# Patient Record
Sex: FEMALE | Race: WHITE | Employment: OTHER | URBAN - NONMETROPOLITAN AREA
[De-identification: names, ages, dates, MRNs, and addresses within clinical notes are randomized per-mention and may not be internally consistent; named-entity substitution may affect disease eponyms.]

---

## 2017-03-17 ENCOUNTER — E-VISIT (OUTPATIENT)
Dept: PRIMARY CARE CLINIC | Age: 62
End: 2017-03-17

## 2017-03-17 DIAGNOSIS — R30.0 DYSURIA: Primary | ICD-10-CM

## 2017-08-06 RX ORDER — OMEPRAZOLE 20 MG/1
20 CAPSULE, DELAYED RELEASE ORAL DAILY
Qty: 90 CAPSULE | Refills: 3 | Status: SHIPPED | OUTPATIENT
Start: 2017-08-06 | End: 2018-11-10 | Stop reason: SDUPTHER

## 2017-09-15 ENCOUNTER — TELEPHONE (OUTPATIENT)
Dept: PRIMARY CARE CLINIC | Age: 62
End: 2017-09-15

## 2017-11-15 ENCOUNTER — TELEPHONE (OUTPATIENT)
Dept: PRIMARY CARE CLINIC | Age: 62
End: 2017-11-15

## 2018-01-10 DIAGNOSIS — Z12.31 VISIT FOR SCREENING MAMMOGRAM: Primary | ICD-10-CM

## 2018-01-17 ENCOUNTER — TELEPHONE (OUTPATIENT)
Dept: PRIMARY CARE CLINIC | Age: 63
End: 2018-01-17

## 2018-01-19 ENCOUNTER — HOSPITAL ENCOUNTER (OUTPATIENT)
Dept: LAB | Age: 63
Setting detail: SPECIMEN
Discharge: HOME | End: 2018-01-19
Payer: COMMERCIAL

## 2018-01-19 DIAGNOSIS — Z00.00 ROUTINE GENERAL MEDICAL EXAMINATION AT A HEALTH CARE FACILITY: ICD-10-CM

## 2018-01-19 LAB
ABSOLUTE EOS #: 0.4 K/UL (ref 0–0.4)
ABSOLUTE IMMATURE GRANULOCYTE: ABNORMAL K/UL (ref 0–0.3)
ABSOLUTE LYMPH #: 2.7 K/UL (ref 1–4.8)
ABSOLUTE MONO #: 0.6 K/UL (ref 0.1–1.2)
ALBUMIN SERPL-MCNC: 4.6 G/DL (ref 3.5–5.2)
ALBUMIN/GLOBULIN RATIO: 1.3 (ref 1–2.5)
ALP BLD-CCNC: 98 U/L (ref 35–104)
ALT SERPL-CCNC: 24 U/L (ref 5–33)
ANION GAP SERPL CALCULATED.3IONS-SCNC: 13 MMOL/L (ref 9–17)
AST SERPL-CCNC: 21 U/L
BASOPHILS # BLD: 1 % (ref 0–1)
BASOPHILS ABSOLUTE: 0.1 K/UL (ref 0–0.2)
BILIRUB SERPL-MCNC: 1.52 MG/DL (ref 0.3–1.2)
BUN BLDV-MCNC: 17 MG/DL (ref 8–23)
BUN/CREAT BLD: 22 (ref 9–20)
CALCIUM SERPL-MCNC: 10.3 MG/DL (ref 8.6–10.4)
CHLORIDE BLD-SCNC: 100 MMOL/L (ref 98–107)
CHOLESTEROL/HDL RATIO: 3.5
CHOLESTEROL: 199 MG/DL
CO2: 28 MMOL/L (ref 20–31)
CREAT SERPL-MCNC: 0.76 MG/DL (ref 0.5–0.9)
DIFFERENTIAL TYPE: ABNORMAL
EOSINOPHILS RELATIVE PERCENT: 5 % (ref 1–7)
GFR AFRICAN AMERICAN: >60 ML/MIN
GFR NON-AFRICAN AMERICAN: >60 ML/MIN
GFR SERPL CREATININE-BSD FRML MDRD: ABNORMAL ML/MIN/{1.73_M2}
GFR SERPL CREATININE-BSD FRML MDRD: ABNORMAL ML/MIN/{1.73_M2}
GLUCOSE BLD-MCNC: 101 MG/DL (ref 70–99)
HCT VFR BLD CALC: 44.4 % (ref 36–46)
HDLC SERPL-MCNC: 57 MG/DL
HEMOGLOBIN: 14.5 G/DL (ref 12–16)
IMMATURE GRANULOCYTES: ABNORMAL %
LDL CHOLESTEROL: 101 MG/DL (ref 0–130)
LYMPHOCYTES # BLD: 32 % (ref 16–46)
MCH RBC QN AUTO: 27.8 PG (ref 26–34)
MCHC RBC AUTO-ENTMCNC: 32.7 G/DL (ref 31–37)
MCV RBC AUTO: 85 FL (ref 80–100)
MONOCYTES # BLD: 7 % (ref 4–11)
NRBC AUTOMATED: ABNORMAL PER 100 WBC
PDW BLD-RTO: 13.7 % (ref 11–14.5)
PLATELET # BLD: 334 K/UL (ref 140–450)
PLATELET ESTIMATE: ABNORMAL
PMV BLD AUTO: 7 FL (ref 6–12)
POTASSIUM SERPL-SCNC: 3.9 MMOL/L (ref 3.7–5.3)
RBC # BLD: 5.22 M/UL (ref 4–5.2)
RBC # BLD: ABNORMAL 10*6/UL
SEG NEUTROPHILS: 55 % (ref 43–77)
SEGMENTED NEUTROPHILS ABSOLUTE COUNT: 4.6 K/UL (ref 1.8–7.7)
SODIUM BLD-SCNC: 141 MMOL/L (ref 135–144)
TOTAL PROTEIN: 8.2 G/DL (ref 6.4–8.3)
TRIGL SERPL-MCNC: 204 MG/DL
VLDLC SERPL CALC-MCNC: ABNORMAL MG/DL (ref 1–30)
WBC # BLD: 8.4 K/UL (ref 3.5–11)
WBC # BLD: ABNORMAL 10*3/UL

## 2018-01-19 PROCEDURE — 80061 LIPID PANEL: CPT

## 2018-01-19 PROCEDURE — 80053 COMPREHEN METABOLIC PANEL: CPT

## 2018-01-19 PROCEDURE — 85025 COMPLETE CBC W/AUTO DIFF WBC: CPT

## 2018-01-19 PROCEDURE — 36415 COLL VENOUS BLD VENIPUNCTURE: CPT

## 2018-02-28 ENCOUNTER — HOSPITAL ENCOUNTER (OUTPATIENT)
Dept: MAMMOGRAPHY | Age: 63
Discharge: HOME | End: 2018-03-02
Payer: COMMERCIAL

## 2018-02-28 DIAGNOSIS — Z12.31 VISIT FOR SCREENING MAMMOGRAM: ICD-10-CM

## 2018-02-28 PROCEDURE — 77063 BREAST TOMOSYNTHESIS BI: CPT

## 2018-03-07 ENCOUNTER — OFFICE VISIT (OUTPATIENT)
Dept: FAMILY MEDICINE CLINIC | Age: 63
End: 2018-03-07
Payer: COMMERCIAL

## 2018-03-07 VITALS
BODY MASS INDEX: 37.82 KG/M2 | WEIGHT: 227 LBS | RESPIRATION RATE: 16 BRPM | HEIGHT: 65 IN | HEART RATE: 68 BPM | DIASTOLIC BLOOD PRESSURE: 88 MMHG | SYSTOLIC BLOOD PRESSURE: 138 MMHG

## 2018-03-07 DIAGNOSIS — Z00.00 ROUTINE GENERAL MEDICAL EXAMINATION AT A HEALTH CARE FACILITY: Primary | ICD-10-CM

## 2018-03-07 DIAGNOSIS — R73.01 IMPAIRED FASTING GLUCOSE: ICD-10-CM

## 2018-03-07 DIAGNOSIS — E78.2 MIXED HYPERLIPIDEMIA: ICD-10-CM

## 2018-03-07 DIAGNOSIS — L82.1 SEBORRHEIC KERATOSIS: ICD-10-CM

## 2018-03-07 DIAGNOSIS — Z11.59 ENCOUNTER FOR HEPATITIS C SCREENING TEST FOR LOW RISK PATIENT: ICD-10-CM

## 2018-03-07 PROCEDURE — 99396 PREV VISIT EST AGE 40-64: CPT | Performed by: FAMILY MEDICINE

## 2018-03-07 ASSESSMENT — PATIENT HEALTH QUESTIONNAIRE - PHQ9
SUM OF ALL RESPONSES TO PHQ9 QUESTIONS 1 & 2: 0
SUM OF ALL RESPONSES TO PHQ QUESTIONS 1-9: 0
2. FEELING DOWN, DEPRESSED OR HOPELESS: 0
1. LITTLE INTEREST OR PLEASURE IN DOING THINGS: 0

## 2018-03-07 ASSESSMENT — ENCOUNTER SYMPTOMS
COUGH: 0
DIARRHEA: 0
SINUS PRESSURE: 0
ABDOMINAL PAIN: 0
VOMITING: 0
WHEEZING: 0
RHINORRHEA: 0
CONSTIPATION: 0
SORE THROAT: 0
TROUBLE SWALLOWING: 0
NAUSEA: 0
EYE DISCHARGE: 0
EYE REDNESS: 0
SHORTNESS OF BREATH: 0

## 2018-03-07 NOTE — PROGRESS NOTES
Patient declines hiv screening and new shingles vaccine at this time. 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 Unsure of date.
Ratio 3.5 <5    Triglycerides 204 (H) <150 mg/dL    VLDL NOT REPORTED 1 - 30 mg/dL   CBC With Auto Differential   Result Value Ref Range    WBC 8.4 3.5 - 11.0 k/uL    RBC 5.22 (H) 4.0 - 5.2 m/uL    Hemoglobin 14.5 12.0 - 16.0 g/dL    Hematocrit 44.4 36 - 46 %    MCV 85.0 80 - 100 fL    MCH 27.8 26 - 34 pg    MCHC 32.7 31 - 37 g/dL    RDW 13.7 11.0 - 14.5 %    Platelets 761 864 - 826 k/uL    MPV 7.0 6.0 - 12.0 fL    NRBC Automated NOT REPORTED per 100 WBC    Differential Type NOT REPORTED     Immature Granulocytes NOT REPORTED 0 %    Absolute Immature Granulocyte NOT REPORTED 0.00 - 0.30 k/uL    WBC Morphology NOT REPORTED     RBC Morphology NOT REPORTED     Platelet Estimate NOT REPORTED     Seg Neutrophils 55 43 - 77 %    Lymphocytes 32 16 - 46 %    Monocytes 7 4 - 11 %    Eosinophils % 5 1 - 7 %    Basophils 1 0 - 1 %    Segs Absolute 4.60 1.8 - 7.7 k/uL    Absolute Lymph # 2.70 1.0 - 4.8 k/uL    Absolute Mono # 0.60 0.1 - 1.2 k/uL    Absolute Eos # 0.40 0.0 - 0.4 k/uL    Basophils # 0.10 0.0 - 0.2 k/uL     Did discuss dietary modification and increased exercise to keep cholesterol and blood sugar under good control. Other review of systems are as noted below. Did review patient's med list, allergies, social history, fam history, pmhx and pshx today as noted in the record. Preventative measures are reviewed today. See health maintenance section for complete preventative plan of care. Review of Systems   Constitutional: Negative for chills, fatigue and fever. HENT: Negative for congestion, ear pain, postnasal drip, rhinorrhea, sinus pressure, sore throat and trouble swallowing. Eyes: Negative for discharge and redness. Respiratory: Negative for cough, shortness of breath and wheezing. Cardiovascular: Negative for chest pain. Gastrointestinal: Negative for abdominal pain, constipation, diarrhea, nausea and vomiting. Musculoskeletal: Negative for arthralgias, myalgias and neck pain.    Skin:

## 2018-04-05 ENCOUNTER — PATIENT MESSAGE (OUTPATIENT)
Dept: FAMILY MEDICINE CLINIC | Age: 63
End: 2018-04-05

## 2018-08-02 ENCOUNTER — OFFICE VISIT (OUTPATIENT)
Dept: FAMILY MEDICINE CLINIC | Age: 63
End: 2018-08-02
Payer: COMMERCIAL

## 2018-08-02 VITALS
SYSTOLIC BLOOD PRESSURE: 130 MMHG | HEIGHT: 65 IN | BODY MASS INDEX: 36.82 KG/M2 | WEIGHT: 221 LBS | RESPIRATION RATE: 16 BRPM | HEART RATE: 76 BPM | DIASTOLIC BLOOD PRESSURE: 80 MMHG

## 2018-08-02 DIAGNOSIS — R73.01 IMPAIRED FASTING GLUCOSE: Primary | ICD-10-CM

## 2018-08-02 DIAGNOSIS — Z00.00 ROUTINE GENERAL MEDICAL EXAMINATION AT A HEALTH CARE FACILITY: ICD-10-CM

## 2018-08-02 DIAGNOSIS — L98.9 SKIN LESION OF LEFT LEG: ICD-10-CM

## 2018-08-02 DIAGNOSIS — E78.2 MIXED HYPERLIPIDEMIA: ICD-10-CM

## 2018-08-02 PROCEDURE — 99213 OFFICE O/P EST LOW 20 MIN: CPT | Performed by: FAMILY MEDICINE

## 2018-08-02 ASSESSMENT — ENCOUNTER SYMPTOMS
ABDOMINAL PAIN: 0
NAUSEA: 0
WHEEZING: 0
RHINORRHEA: 0
EYE DISCHARGE: 0
CONSTIPATION: 0
SHORTNESS OF BREATH: 0
SORE THROAT: 0
SINUS PRESSURE: 0
TROUBLE SWALLOWING: 0
DIARRHEA: 0
VOMITING: 0
COUGH: 0
EYE REDNESS: 0

## 2018-08-02 NOTE — PROGRESS NOTES
33 U/L    AST 21 <32 U/L    Total Bilirubin 1.52 (H) 0.3 - 1.2 mg/dL    Total Protein 8.2 6.4 - 8.3 g/dL    Alb 4.6 3.5 - 5.2 g/dL    Albumin/Globulin Ratio 1.3 1.0 - 2.5    GFR Non-African American >60 >60 mL/min    GFR African American >60 >60 mL/min    GFR Comment          GFR Staging NOT REPORTED    Lipid Panel   Result Value Ref Range    Cholesterol 199 <200 mg/dL    HDL 57 >40 mg/dL    LDL Cholesterol 101 0 - 130 mg/dL    Chol/HDL Ratio 3.5 <5    Triglycerides 204 (H) <150 mg/dL    VLDL NOT REPORTED 1 - 30 mg/dL   CBC With Auto Differential   Result Value Ref Range    WBC 8.4 3.5 - 11.0 k/uL    RBC 5.22 (H) 4.0 - 5.2 m/uL    Hemoglobin 14.5 12.0 - 16.0 g/dL    Hematocrit 44.4 36 - 46 %    MCV 85.0 80 - 100 fL    MCH 27.8 26 - 34 pg    MCHC 32.7 31 - 37 g/dL    RDW 13.7 11.0 - 14.5 %    Platelets 623 672 - 742 k/uL    MPV 7.0 6.0 - 12.0 fL    NRBC Automated NOT REPORTED per 100 WBC    Differential Type NOT REPORTED     Immature Granulocytes NOT REPORTED 0 %    Absolute Immature Granulocyte NOT REPORTED 0.00 - 0.30 k/uL    WBC Morphology NOT REPORTED     RBC Morphology NOT REPORTED     Platelet Estimate NOT REPORTED     Seg Neutrophils 55 43 - 77 %    Lymphocytes 32 16 - 46 %    Monocytes 7 4 - 11 %    Eosinophils % 5 1 - 7 %    Basophils 1 0 - 1 %    Segs Absolute 4.60 1.8 - 7.7 k/uL    Absolute Lymph # 2.70 1.0 - 4.8 k/uL    Absolute Mono # 0.60 0.1 - 1.2 k/uL    Absolute Eos # 0.40 0.0 - 0.4 k/uL    Basophils # 0.10 0.0 - 0.2 k/uL   Other review of systems are as noted below. Did review patient's med list, allergies, social history, fam history, pmhx and pshx today as noted in the record. Preventative measures are reviewed today. See health maintenance section for complete preventative plan of care. Review of Systems   Constitutional: Negative for chills, fatigue and fever. HENT: Negative for congestion, ear pain, postnasal drip, rhinorrhea, sinus pressure, sore throat and trouble swallowing. Eyes: Negative for discharge and redness. Respiratory: Negative for cough, shortness of breath and wheezing. Cardiovascular: Negative for chest pain. Gastrointestinal: Negative for abdominal pain, constipation, diarrhea, nausea and vomiting. Musculoskeletal: Negative for arthralgias, myalgias and neck pain. Skin: Negative for rash and wound. Thickened skin lesion of the left lower leg   Allergic/Immunologic: Negative for environmental allergies. Neurological: Negative for dizziness, weakness, light-headedness and headaches. Hematological: Negative for adenopathy. Psychiatric/Behavioral: Negative. Objective:   Physical Exam   Constitutional: She is oriented to person, place, and time. She appears well-developed and well-nourished. No distress. HENT:   Head: Normocephalic and atraumatic. Right Ear: External ear normal.   Left Ear: External ear normal.   Nose: Nose normal.   Mouth/Throat: Oropharynx is clear and moist. No oropharyngeal exudate. Eyes: Conjunctivae and EOM are normal. Pupils are equal, round, and reactive to light. Right eye exhibits no discharge. Left eye exhibits no discharge. Neck: Normal range of motion. Neck supple. No thyromegaly present. Cardiovascular: Normal rate, regular rhythm and normal heart sounds. Pulmonary/Chest: Effort normal and breath sounds normal. She has no wheezes. She has no rales. Abdominal: Soft. Bowel sounds are normal.   Musculoskeletal: She exhibits no edema. Lymphadenopathy:     She has no cervical adenopathy. Neurological: She is alert and oriented to person, place, and time. Skin: Skin is warm and dry. No rash noted. She is not diaphoretic. Light brown pigmented skin lesion to the left lower leg. Lesion is thick and fibrous in nature. Lesion is about 1 cm in diameter. Appears to be more of a fibrous benign nevus   Psychiatric: She has a normal mood and affect.  Her behavior is normal. Judgment and thought content

## 2018-11-12 RX ORDER — OMEPRAZOLE 20 MG/1
20 CAPSULE, DELAYED RELEASE ORAL DAILY
Qty: 90 CAPSULE | Refills: 3 | Status: SHIPPED | OUTPATIENT
Start: 2018-11-12 | End: 2019-12-06 | Stop reason: SDUPTHER

## 2019-01-22 ENCOUNTER — PATIENT MESSAGE (OUTPATIENT)
Dept: FAMILY MEDICINE CLINIC | Age: 64
End: 2019-01-22

## 2019-01-22 RX ORDER — VALACYCLOVIR HYDROCHLORIDE 1 G/1
TABLET, FILM COATED ORAL
Qty: 4 TABLET | Refills: 6 | Status: SHIPPED | OUTPATIENT
Start: 2019-01-22 | End: 2020-03-25

## 2019-01-28 ENCOUNTER — CLINICAL DOCUMENTATION (OUTPATIENT)
Dept: PHARMACY | Facility: CLINIC | Age: 64
End: 2019-01-28

## 2019-03-13 ENCOUNTER — TELEPHONE (OUTPATIENT)
Dept: PRIMARY CARE CLINIC | Age: 64
End: 2019-03-13

## 2019-04-25 ENCOUNTER — HOSPITAL ENCOUNTER (OUTPATIENT)
Dept: LAB | Age: 64
Discharge: HOME | End: 2019-04-25
Payer: COMMERCIAL

## 2019-04-25 DIAGNOSIS — E78.2 MIXED HYPERLIPIDEMIA: ICD-10-CM

## 2019-04-25 DIAGNOSIS — Z00.00 ROUTINE GENERAL MEDICAL EXAMINATION AT A HEALTH CARE FACILITY: ICD-10-CM

## 2019-04-25 DIAGNOSIS — R73.01 IMPAIRED FASTING GLUCOSE: ICD-10-CM

## 2019-04-25 DIAGNOSIS — Z11.59 ENCOUNTER FOR HEPATITIS C SCREENING TEST FOR LOW RISK PATIENT: ICD-10-CM

## 2019-04-25 LAB
ABSOLUTE EOS #: 0.5 K/UL (ref 0–0.4)
ABSOLUTE IMMATURE GRANULOCYTE: ABNORMAL K/UL (ref 0–0.3)
ABSOLUTE LYMPH #: 2.1 K/UL (ref 1–4.8)
ABSOLUTE MONO #: 0.6 K/UL (ref 0.1–1.2)
ALBUMIN SERPL-MCNC: 4.6 G/DL (ref 3.5–5.2)
ALBUMIN/GLOBULIN RATIO: 1.4 (ref 1–2.5)
ALP BLD-CCNC: 101 U/L (ref 35–104)
ALT SERPL-CCNC: 24 U/L (ref 5–33)
ANION GAP SERPL CALCULATED.3IONS-SCNC: 11 MMOL/L (ref 9–17)
AST SERPL-CCNC: 20 U/L
BASOPHILS # BLD: 1 % (ref 0–1)
BASOPHILS ABSOLUTE: 0.1 K/UL (ref 0–0.2)
BILIRUB SERPL-MCNC: 1.61 MG/DL (ref 0.3–1.2)
BUN BLDV-MCNC: 19 MG/DL (ref 8–23)
BUN/CREAT BLD: 29 (ref 9–20)
CALCIUM SERPL-MCNC: 9.6 MG/DL (ref 8.6–10.4)
CHLORIDE BLD-SCNC: 105 MMOL/L (ref 98–107)
CHOLESTEROL/HDL RATIO: 3.5
CHOLESTEROL: 182 MG/DL
CO2: 26 MMOL/L (ref 20–31)
CREAT SERPL-MCNC: 0.66 MG/DL (ref 0.5–0.9)
DIFFERENTIAL TYPE: ABNORMAL
EOSINOPHILS RELATIVE PERCENT: 5 % (ref 1–7)
ESTIMATED AVERAGE GLUCOSE: 111 MG/DL
GFR AFRICAN AMERICAN: >60 ML/MIN
GFR NON-AFRICAN AMERICAN: >60 ML/MIN
GFR SERPL CREATININE-BSD FRML MDRD: ABNORMAL ML/MIN/{1.73_M2}
GFR SERPL CREATININE-BSD FRML MDRD: ABNORMAL ML/MIN/{1.73_M2}
GLUCOSE BLD-MCNC: 116 MG/DL (ref 70–99)
HBA1C MFR BLD: 5.5 % (ref 4.8–5.9)
HCT VFR BLD CALC: 42.1 % (ref 36–46)
HDLC SERPL-MCNC: 52 MG/DL
HEMOGLOBIN: 13.7 G/DL (ref 12–16)
HEPATITIS C ANTIBODY: NONREACTIVE
IMMATURE GRANULOCYTES: ABNORMAL %
LDL CHOLESTEROL: 105 MG/DL (ref 0–130)
LYMPHOCYTES # BLD: 24 % (ref 16–46)
MCH RBC QN AUTO: 27.8 PG (ref 26–34)
MCHC RBC AUTO-ENTMCNC: 32.5 G/DL (ref 31–37)
MCV RBC AUTO: 85.7 FL (ref 80–100)
MONOCYTES # BLD: 7 % (ref 4–11)
NRBC AUTOMATED: ABNORMAL PER 100 WBC
PDW BLD-RTO: 13.9 % (ref 11–14.5)
PLATELET # BLD: 357 K/UL (ref 140–450)
PLATELET ESTIMATE: ABNORMAL
PMV BLD AUTO: 7 FL (ref 6–12)
POTASSIUM SERPL-SCNC: 4.4 MMOL/L (ref 3.7–5.3)
RBC # BLD: 4.92 M/UL (ref 4–5.2)
RBC # BLD: ABNORMAL 10*6/UL
SEG NEUTROPHILS: 63 % (ref 43–77)
SEGMENTED NEUTROPHILS ABSOLUTE COUNT: 5.7 K/UL (ref 1.8–7.7)
SODIUM BLD-SCNC: 142 MMOL/L (ref 135–144)
TOTAL PROTEIN: 7.8 G/DL (ref 6.4–8.3)
TRIGL SERPL-MCNC: 123 MG/DL
VLDLC SERPL CALC-MCNC: NORMAL MG/DL (ref 1–30)
WBC # BLD: 8.9 K/UL (ref 3.5–11)
WBC # BLD: ABNORMAL 10*3/UL

## 2019-04-25 PROCEDURE — 80061 LIPID PANEL: CPT

## 2019-04-25 PROCEDURE — 86803 HEPATITIS C AB TEST: CPT

## 2019-04-25 PROCEDURE — 85025 COMPLETE CBC W/AUTO DIFF WBC: CPT

## 2019-04-25 PROCEDURE — 36415 COLL VENOUS BLD VENIPUNCTURE: CPT

## 2019-04-25 PROCEDURE — 83036 HEMOGLOBIN GLYCOSYLATED A1C: CPT

## 2019-04-25 PROCEDURE — 80053 COMPREHEN METABOLIC PANEL: CPT

## 2019-04-26 ENCOUNTER — TELEPHONE (OUTPATIENT)
Dept: MAMMOGRAPHY | Age: 64
End: 2019-04-26

## 2019-04-26 DIAGNOSIS — Z12.31 VISIT FOR SCREENING MAMMOGRAM: Primary | ICD-10-CM

## 2019-04-29 ENCOUNTER — OFFICE VISIT (OUTPATIENT)
Dept: FAMILY MEDICINE CLINIC | Age: 64
End: 2019-04-29
Payer: COMMERCIAL

## 2019-04-29 ENCOUNTER — HOSPITAL ENCOUNTER (OUTPATIENT)
Dept: MAMMOGRAPHY | Age: 64
Discharge: HOME | End: 2019-05-01
Payer: COMMERCIAL

## 2019-04-29 VITALS
BODY MASS INDEX: 37.99 KG/M2 | RESPIRATION RATE: 16 BRPM | DIASTOLIC BLOOD PRESSURE: 80 MMHG | HEART RATE: 72 BPM | SYSTOLIC BLOOD PRESSURE: 130 MMHG | HEIGHT: 65 IN | WEIGHT: 228 LBS

## 2019-04-29 DIAGNOSIS — R73.01 IMPAIRED FASTING GLUCOSE: ICD-10-CM

## 2019-04-29 DIAGNOSIS — Z00.00 ROUTINE GENERAL MEDICAL EXAMINATION AT A HEALTH CARE FACILITY: Primary | ICD-10-CM

## 2019-04-29 DIAGNOSIS — Z12.31 VISIT FOR SCREENING MAMMOGRAM: ICD-10-CM

## 2019-04-29 DIAGNOSIS — E78.2 MIXED HYPERLIPIDEMIA: ICD-10-CM

## 2019-04-29 PROCEDURE — 99396 PREV VISIT EST AGE 40-64: CPT | Performed by: FAMILY MEDICINE

## 2019-04-29 PROCEDURE — 77063 BREAST TOMOSYNTHESIS BI: CPT

## 2019-04-29 ASSESSMENT — ENCOUNTER SYMPTOMS
SORE THROAT: 0
NAUSEA: 0
VOMITING: 0
EYE REDNESS: 0
ABDOMINAL PAIN: 0
SHORTNESS OF BREATH: 0
CONSTIPATION: 0
TROUBLE SWALLOWING: 0
SINUS PRESSURE: 0
EYE DISCHARGE: 0
COUGH: 0
RHINORRHEA: 0
DIARRHEA: 0
WHEEZING: 0

## 2019-04-29 ASSESSMENT — PATIENT HEALTH QUESTIONNAIRE - PHQ9
SUM OF ALL RESPONSES TO PHQ9 QUESTIONS 1 & 2: 0
SUM OF ALL RESPONSES TO PHQ QUESTIONS 1-9: 0
2. FEELING DOWN, DEPRESSED OR HOPELESS: 0
1. LITTLE INTEREST OR PLEASURE IN DOING THINGS: 0
SUM OF ALL RESPONSES TO PHQ QUESTIONS 1-9: 0

## 2019-04-29 NOTE — PROGRESS NOTES
2019    Mindi Salas (:  1955) is a 59 y.o. female, here for a preventive medicine evaluation. Patient comes in today for a routine general physical exam and for follow up of chronic health issues   Chief Complaint   Patient presents with    Annual Exam     last 3/7/18; Be Well Within    Hyperlipidemia     6 mo f/u    Blood Sugar Problem     [facility]; 6 mo f/u    Discuss Labs     drawn 19   . Patient seems to be doing relatively well overall. Does struggle with her weight. She has been trying to quantity that she eats and doing more exercise but still struggles to lose any weight. Encouraged her to continue with dietary efforts and routine exercise to keep her weight optimally controlled. Her lab work looks better than last check. Her cholesterol levels have improved with just slight elevation in her triglycerides but otherwise much better compared to last check. Did encourage continued low fat/high fiber diet in order to keep this optimally controlled. As a known history of impaired fasting glucose and her blood sugar is more elevated than last check but her hemoglobin A1c shows her blood sugar averages are staying within an acceptable range. Encouraged continued low carb/low sugar diet and routine exercise keep this optimally controlled. The rest her labs are essentially normal.  Patient otherwise has no other acute medical concerns at this time. .  Patient's recent lab reports are as follows:    Results for orders placed or performed during the hospital encounter of 19   Lipid Panel   Result Value Ref Range    Cholesterol 182 <200 mg/dL    HDL 52 >40 mg/dL    LDL Cholesterol 105 0 - 130 mg/dL    Chol/HDL Ratio 3.5 <5    Triglycerides 123 <150 mg/dL    VLDL NOT REPORTED 1 - 30 mg/dL   Hemoglobin A1C   Result Value Ref Range    Hemoglobin A1C 5.5 4.8 - 5.9 %    Estimated Avg Glucose 111 mg/dL   Comprehensive Metabolic Panel   Result Value Ref Range    Glucose 116 (H) 70 - 99 mg/dL    BUN 19 8 - 23 mg/dL    CREATININE 0.66 0.50 - 0.90 mg/dL    Bun/Cre Ratio 29 (H) 9 - 20    Calcium 9.6 8.6 - 10.4 mg/dL    Sodium 142 135 - 144 mmol/L    Potassium 4.4 3.7 - 5.3 mmol/L    Chloride 105 98 - 107 mmol/L    CO2 26 20 - 31 mmol/L    Anion Gap 11 9 - 17 mmol/L    Alkaline Phosphatase 101 35 - 104 U/L    ALT 24 5 - 33 U/L    AST 20 <32 U/L    Total Bilirubin 1.61 (H) 0.3 - 1.2 mg/dL    Total Protein 7.8 6.4 - 8.3 g/dL    Alb 4.6 3.5 - 5.2 g/dL    Albumin/Globulin Ratio 1.4 1.0 - 2.5    GFR Non-African American >60 >60 mL/min    GFR African American >60 >60 mL/min    GFR Comment          GFR Staging NOT REPORTED    CBC Auto Differential   Result Value Ref Range    WBC 8.9 3.5 - 11.0 k/uL    RBC 4.92 4.0 - 5.2 m/uL    Hemoglobin 13.7 12.0 - 16.0 g/dL    Hematocrit 42.1 36 - 46 %    MCV 85.7 80 - 100 fL    MCH 27.8 26 - 34 pg    MCHC 32.5 31 - 37 g/dL    RDW 13.9 11.0 - 14.5 %    Platelets 539 962 - 419 k/uL    MPV 7.0 6.0 - 12.0 fL    NRBC Automated NOT REPORTED per 100 WBC    Differential Type NOT REPORTED     Immature Granulocytes NOT REPORTED 0 %    Absolute Immature Granulocyte NOT REPORTED 0.00 - 0.30 k/uL    WBC Morphology NOT REPORTED     RBC Morphology NOT REPORTED     Platelet Estimate NOT REPORTED     Seg Neutrophils 63 43 - 77 %    Lymphocytes 24 16 - 46 %    Monocytes 7 4 - 11 %    Eosinophils % 5 1 - 7 %    Basophils 1 0 - 1 %    Segs Absolute 5.70 1.8 - 7.7 k/uL    Absolute Lymph # 2.10 1.0 - 4.8 k/uL    Absolute Mono # 0.60 0.1 - 1.2 k/uL    Absolute Eos # 0.50 (H) 0.0 - 0.4 k/uL    Basophils # 0.10 0.0 - 0.2 k/uL   Hepatitis C Antibody   Result Value Ref Range    Hepatitis C Ab NONREACTIVE NONREACTIVE     Did discuss dietary modification and increased exercise to keep cholesterol and blood sugar under good control. Other review of systems are as noted below. Preventative measures are reviewed today.  See health maintenance section for complete preventative plan of care.    Patient Active Problem List   Diagnosis    Impaired fasting glucose    Mixed hyperlipidemia       Review of Systems   Constitutional: Negative for chills, fatigue and fever. HENT: Negative for congestion, ear pain, postnasal drip, rhinorrhea, sinus pressure, sore throat and trouble swallowing. Eyes: Negative for discharge and redness. Respiratory: Negative for cough, shortness of breath and wheezing. Cardiovascular: Negative for chest pain. Gastrointestinal: Negative for abdominal pain, constipation, diarrhea, nausea and vomiting. Genitourinary: Negative. Musculoskeletal: Negative for arthralgias, myalgias and neck pain. Skin: Negative for rash and wound. Allergic/Immunologic: Negative for environmental allergies. Neurological: Negative for dizziness, weakness, light-headedness and headaches. Hematological: Negative for adenopathy. Psychiatric/Behavioral: Negative. Prior to Visit Medications    Medication Sig Taking? Authorizing Provider   omeprazole (PRILOSEC) 20 MG delayed release capsule TAKE 1 CAPSULE BY MOUTH DAILY Yes Louann Lennon MD   Multiple Vitamins-Minerals (MULTIVITAMIN PO) Take 1 tablet by mouth daily. Yes Historical Provider, MD   valACYclovir (VALTREX) 1 g tablet 2 TABS BID FOR 1 DAY  Caro Leavitt DO   ibuprofen (ADVIL;MOTRIN) 600 MG tablet Take 600 mg by mouth every 8 hours as needed for Pain. 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   Did have Laser Cone procedure       Past Surgical History:   Procedure Laterality Date    BLADDER SUSPENSION  2014    Dr Charles Mitchell BIOPSY  12/09/1999    Laser cone due to HSIL     SECTION      CHOLECYSTECTOMY      COLONOSCOPY  10/17/2016    hyperplastic  polyp, Dr. Dilma Andrea, VAGINAL  2014    Dr Rey Stone; [facility] with BSO, external [place] culdoplasty with uterosacral ligament suspension, anterior colprrhapy, cystourethyocopy, posterior colporrhapy    SALPINGO-OOPHORECTOMY Bilateral 2014    Dr Tyrel Wells  10/17/2016    gastritis, superficial ulcer in esophagus, Dr. Chela Lees History    Marital status:      Spouse name: Not on file    Number of children: Not on file    Years of education: Not on file    Highest education level: Not on file   Occupational History    Not on file   Social Needs    Financial resource strain: Not on file    Food insecurity:     Worry: Not on file     Inability: Not on file    Transportation needs:     Medical: Not on file     Non-medical: Not on file   Tobacco Use    Smoking status: Never Smoker    Smokeless tobacco: Never Used   Substance and Sexual Activity    Alcohol use: No    Drug use: No    Sexual activity: Not on file   Lifestyle    Physical activity:     Days per week: Not on file     Minutes per session: Not on file    Stress: Not on file   Relationships    Social connections:     Talks on phone: Not on file     Gets together: Not on file     Attends Gnosticist service: Not on file     Active member of club or organization: Not on file     Attends meetings of clubs or organizations: Not on file     Relationship status: Not on file    Intimate partner violence:     Fear of current or ex partner: Not on file     Emotionally abused: Not on file     Physically abused: Not on file     Forced sexual activity: Not on file   Other Topics Concern    Not on file   Social History Narrative    Not on file        Family History   Problem Relation Age of Onset    Cancer Mother         PANCREATIC    Cancer Sister BREAST       ADVANCE DIRECTIVE: N, Not Received    Vitals:    04/29/19 0913   BP: 130/80   Site: Left Upper Arm   Position: Sitting   Cuff Size: Large Adult   Pulse: 72   Resp: 16   Weight: 228 lb (103.4 kg)   Height: 5' 4.5\" (1.638 m)     Estimated body mass index is 38.53 kg/m² as calculated from the following:    Height as of this encounter: 5' 4.5\" (1.638 m). Weight as of this encounter: 228 lb (103.4 kg). Physical Exam   Constitutional: She is oriented to person, place, and time. She appears well-developed and well-nourished. No distress. HENT:   Head: Normocephalic and atraumatic. Right Ear: External ear normal.   Left Ear: External ear normal.   Nose: Nose normal.   Mouth/Throat: Oropharynx is clear and moist. No oropharyngeal exudate. Eyes: Pupils are equal, round, and reactive to light. Conjunctivae and EOM are normal. Right eye exhibits no discharge. Left eye exhibits no discharge. Neck: Normal range of motion. Neck supple. No thyromegaly present. Cardiovascular: Normal rate, regular rhythm and normal heart sounds. Pulmonary/Chest: Effort normal and breath sounds normal. She has no wheezes. She has no rales. Abdominal: Soft. Bowel sounds are normal. She exhibits no distension and no mass. There is no tenderness. There is no rebound and no guarding. No hernia. Musculoskeletal: She exhibits no edema. Lymphadenopathy:     She has no cervical adenopathy. Neurological: She is alert and oriented to person, place, and time. Skin: Skin is warm and dry. No rash noted. She is not diaphoretic. Psychiatric: She has a normal mood and affect. Her behavior is normal. Judgment and thought content normal.   Nursing note and vitals reviewed. No flowsheet data found.     Lab Results   Component Value Date    CHOL 182 04/25/2019    CHOL 199 01/19/2018    CHOL 185 07/28/2016    TRIG 123 04/25/2019    TRIG 204 01/19/2018    TRIG 156 07/28/2016    HDL 52 04/25/2019    HDL 57 01/19/2018    HDL 57 07/28/2016    LDLCHOLESTEROL 105 04/25/2019    LDLCHOLESTEROL 101 01/19/2018    LDLCHOLESTEROL 97 07/28/2016    GLUCOSE 116 04/25/2019    LABA1C 5.5 04/25/2019       The 10-year ASCVD risk score (Doroteo Gautam, et al., 2013) is: 4.1%    Values used to calculate the score:      Age: 59 years      Sex: Female      Is Non- : No      Diabetic: No      Tobacco smoker: No      Systolic Blood Pressure: 694 mmHg      Is BP treated: No      HDL Cholesterol: 52 mg/dL      Total Cholesterol: 182 mg/dL    Immunization History   Administered Date(s) Administered    Hepatitis B, unspecified formulation 08/28/1995, 09/27/1995, 02/27/1996    Influenza Virus Vaccine 10/21/2015    Tdap (Boostrix, Adacel) 11/12/2015    Zoster Live (Zostavax) 06/14/2013       Health Maintenance   Topic Date Due    HIV screen  12/21/1971    Shingles Vaccine (2 of 3) 08/09/2013    Flu vaccine (Season Ended) 09/01/2019    Breast cancer screen  02/28/2020    A1C test (Diabetic or Prediabetic)  04/25/2020    Colon cancer screen colonoscopy  10/17/2021    Lipid screen  04/25/2024    DTaP/Tdap/Td vaccine (2 - Td) 11/12/2025    Hepatitis C screen  Completed    Pneumococcal 0-64 years Vaccine  Aged Out       ASSESSMENT/PLAN:  1. Routine general medical examination at a health care facility  Healthy dietary intake and routine exercise are encouraged. - Lipid Panel; Future  - CBC Auto Differential; Future  - Comprehensive Metabolic Panel; Future    2. Impaired fasting glucose  Continued low carb/low sugar diet and routine exercise encouraged. - Hemoglobin A1C; Future    3. Mixed hyperlipidemia  Stable and controlled with dietary efforts. Continue low-fat/high-fiber diet encouraged. No orders of the defined types were placed in this encounter.     Orders Placed This Encounter   Procedures    Lipid Panel     Standing Status:   Future     Standing Expiration Date:   4/27/2020     Order Specific Question:   Is Patient

## 2019-04-29 NOTE — PATIENT INSTRUCTIONS
Hospital Outpatient Visit on 04/25/2019   Component Date Value Ref Range Status    Cholesterol 04/25/2019 182  <200 mg/dL Final    Comment:    Cholesterol Guidelines:      <200  Desirable   200-240  Borderline      >240  Undesirable         HDL 04/25/2019 52  >40 mg/dL Final    Comment:    HDL Guidelines:    <40     Undesirable   40-59    Borderline    >59     Desirable         LDL Cholesterol 04/25/2019 105  0 - 130 mg/dL Final    Comment:    LDL Guidelines:     <100    Desirable   100-129   Near to/above Desirable   130-159   Borderline      >159   Undesirable     Direct (measured) LDL and calculated LDL are not interchangeable tests.  Chol/HDL Ratio 04/25/2019 3.5  <5 Final            Triglycerides 04/25/2019 123  <150 mg/dL Final    Comment:    Triglyceride Guidelines:     <150   Desirable   150-199  Borderline   200-499  High     >499   Very high   Based on AHA Guidelines for fasting triglyceride, October 2012.          VLDL 04/25/2019 NOT REPORTED  1 - 30 mg/dL Final    Hemoglobin A1C 04/25/2019 5.5  4.8 - 5.9 % Final    Estimated Avg Glucose 04/25/2019 111  mg/dL Final    Glucose 04/25/2019 116* 70 - 99 mg/dL Final    BUN 04/25/2019 19  8 - 23 mg/dL Final    CREATININE 04/25/2019 0.66  0.50 - 0.90 mg/dL Final    Bun/Cre Ratio 04/25/2019 29* 9 - 20 Final    Calcium 04/25/2019 9.6  8.6 - 10.4 mg/dL Final    Sodium 04/25/2019 142  135 - 144 mmol/L Final    Potassium 04/25/2019 4.4  3.7 - 5.3 mmol/L Final    Chloride 04/25/2019 105  98 - 107 mmol/L Final    CO2 04/25/2019 26  20 - 31 mmol/L Final    Anion Gap 04/25/2019 11  9 - 17 mmol/L Final    Alkaline Phosphatase 04/25/2019 101  35 - 104 U/L Final    ALT 04/25/2019 24  5 - 33 U/L Final    AST 04/25/2019 20  <32 U/L Final    Total Bilirubin 04/25/2019 1.61* 0.3 - 1.2 mg/dL Final    Total Protein 04/25/2019 7.8  6.4 - 8.3 g/dL Final    Alb 04/25/2019 4.6  3.5 - 5.2 g/dL Final    Albumin/Globulin Ratio 04/25/2019 1.4  1.0 - 2.5 Final  GFR Non- 04/25/2019 >60  >60 mL/min Final    GFR  04/25/2019 >60  >60 mL/min Final    GFR Comment 04/25/2019        Final    Comment: Average GFR for 61-76 years old:   80 mL/min/1.73sq m  Chronic Kidney Disease:   <60 mL/min/1.73sq m  Kidney failure:   <15 mL/min/1.73sq m              eGFR calculated using average adult body mass.  Additional eGFR calculator available at:        [email or web address]            GFR Staging 04/25/2019 NOT REPORTED   Final    WBC 04/25/2019 8.9  3.5 - 11.0 k/uL Final    RBC 04/25/2019 4.92  4.0 - 5.2 m/uL Final    Hemoglobin 04/25/2019 13.7  12.0 - 16.0 g/dL Final    Hematocrit 04/25/2019 42.1  36 - 46 % Final    MCV 04/25/2019 85.7  80 - 100 fL Final    MCH 04/25/2019 27.8  26 - 34 pg Final    MCHC 04/25/2019 32.5  31 - 37 g/dL Final    RDW 04/25/2019 13.9  11.0 - 14.5 % Final    Platelets 59/44/1494 357  140 - 450 k/uL Final    MPV 04/25/2019 7.0  6.0 - 12.0 fL Final    NRBC Automated 04/25/2019 NOT REPORTED  per 100 WBC Final    Differential Type 04/25/2019 NOT REPORTED   Final    Immature Granulocytes 04/25/2019 NOT REPORTED  0 % Final    Absolute Immature Granulocyte 04/25/2019 NOT REPORTED  0.00 - 0.30 k/uL Final    WBC Morphology 04/25/2019 NOT REPORTED   Final    RBC Morphology 04/25/2019 NOT REPORTED   Final    Platelet Estimate 89/20/0045 NOT REPORTED   Final    Seg Neutrophils 04/25/2019 63  43 - 77 % Final    Lymphocytes 04/25/2019 24  16 - 46 % Final    Monocytes 04/25/2019 7  4 - 11 % Final    Eosinophils % 04/25/2019 5  1 - 7 % Final    Basophils 04/25/2019 1  0 - 1 % Final    Segs Absolute 04/25/2019 5.70  1.8 - 7.7 k/uL Final    Absolute Lymph # 04/25/2019 2.10  1.0 - 4.8 k/uL Final    Absolute Mono # 04/25/2019 0.60  0.1 - 1.2 k/uL Final    Absolute Eos # 04/25/2019 0.50* 0.0 - 0.4 k/uL Final    Basophils # 04/25/2019 0.10  0.0 - 0.2 k/uL Final    Hepatitis C Ab 04/25/2019 NONREACTIVE  NONREACTIVE Final    Comment:       The hepatitis C procedure used in our laboratory is a Chemiluminescent test specific for   three recombinant HCV antigens. A negative anti-HCV result indicates that the antibodies to   hepatitis C virus are not present at this time. Individuals with reactive anti-HCV should be considered infected and infectious until proven   otherwise. Confirmation of all equivocal or reactive results is recommended by ordering   HCV RNA by PCR.

## 2019-12-06 RX ORDER — OMEPRAZOLE 20 MG/1
CAPSULE, DELAYED RELEASE ORAL
Qty: 90 CAPSULE | Refills: 3 | Status: SHIPPED | OUTPATIENT
Start: 2019-12-06 | End: 2021-02-23

## 2020-02-19 ENCOUNTER — OFFICE VISIT (OUTPATIENT)
Dept: FAMILY MEDICINE CLINIC | Age: 65
End: 2020-02-19
Payer: COMMERCIAL

## 2020-02-19 VITALS
OXYGEN SATURATION: 96 % | SYSTOLIC BLOOD PRESSURE: 132 MMHG | TEMPERATURE: 97.8 F | HEIGHT: 64 IN | WEIGHT: 231.6 LBS | BODY MASS INDEX: 39.54 KG/M2 | DIASTOLIC BLOOD PRESSURE: 88 MMHG | HEART RATE: 68 BPM | RESPIRATION RATE: 16 BRPM

## 2020-02-19 PROCEDURE — 90471 IMMUNIZATION ADMIN: CPT | Performed by: FAMILY MEDICINE

## 2020-02-19 PROCEDURE — 99213 OFFICE O/P EST LOW 20 MIN: CPT | Performed by: FAMILY MEDICINE

## 2020-02-19 PROCEDURE — 90750 HZV VACC RECOMBINANT IM: CPT | Performed by: FAMILY MEDICINE

## 2020-02-19 ASSESSMENT — PATIENT HEALTH QUESTIONNAIRE - PHQ9
1. LITTLE INTEREST OR PLEASURE IN DOING THINGS: 0
SUM OF ALL RESPONSES TO PHQ QUESTIONS 1-9: 0
SUM OF ALL RESPONSES TO PHQ9 QUESTIONS 1 & 2: 0
SUM OF ALL RESPONSES TO PHQ QUESTIONS 1-9: 0
DEPRESSION UNABLE TO ASSESS: PT REFUSES
2. FEELING DOWN, DEPRESSED OR HOPELESS: 0

## 2020-02-19 ASSESSMENT — ENCOUNTER SYMPTOMS
RESPIRATORY NEGATIVE: 1
GASTROINTESTINAL NEGATIVE: 1
EYES NEGATIVE: 1
COLOR CHANGE: 1

## 2020-02-19 NOTE — PROGRESS NOTES
well-developed. She is not diaphoretic. HENT:      Head: Normocephalic and atraumatic. Eyes:      Conjunctiva/sclera: Conjunctivae normal.   Neck:      Musculoskeletal: Normal range of motion. Pulmonary:      Effort: Pulmonary effort is normal.   Skin:     General: Skin is warm and dry. Coloration: Skin is not pale. Findings: No erythema or rash. Comments: Dark brown slight raised rough skin lesion to the right cheek. Total diameter of lesion is approximately 1 cm and it does have erythema to the skin edges. Neurological:      Mental Status: She is alert and oriented to person, place, and time. Psychiatric:         Behavior: Behavior normal.         Thought Content: Thought content normal.         Judgment: Judgment normal.         ASSESSMENT/PLAN:  Encounter Diagnoses   Name Primary?  Changing pigmented skin lesion Yes    Need for shingles vaccine      Did perform liquid nitrogen treatment to the skin lesion of the face today. Liquid nitrogen is applied using spray technique until a treatment border that extends 2 mm beyond the wound edges obtained. Patient tolerated this well. Is aware that this will blister and peel and if it does not completely peel she should let my office know and we can retreat. Orders Placed This Encounter   Procedures    Zoster recombinant [facility])     Shingles vaccination is provided today. She is to return to my office in April as scheduled for her routine medical follow-up visit or sooner if any further problems or symptoms arise. (Please note that portions of this note were completed with a voice recognition program. Efforts were made to edit the dictations but occasionally words are mis-transcribed.)        No follow-ups on file. An electronic signature was used to authenticate this note.     --Antonio Hsieh DO on 2/19/2020 at 8:09 AM

## 2020-02-20 ENCOUNTER — PATIENT MESSAGE (OUTPATIENT)
Dept: FAMILY MEDICINE CLINIC | Age: 65
End: 2020-02-20

## 2020-02-21 NOTE — TELEPHONE ENCOUNTER
From: Denton Ortiz  To: Gerald Rao DO  Sent: 2/20/2020 3:33 PM EST  Subject: Non-Urgent Medical Question    Thanks Kimberly Manning for telling me that I may get sick with the shingles shot because I did! Felt crappy, feverish and flu like symptoms. Better today though. As far as my freezing spot. ..how long do I wait to see if it comes off? It really does not feel like it is going to come off.    Just wondering

## 2020-03-25 RX ORDER — VALACYCLOVIR HYDROCHLORIDE 1 G/1
TABLET, FILM COATED ORAL
Qty: 4 TABLET | Refills: 2 | Status: SHIPPED | OUTPATIENT
Start: 2020-03-25 | End: 2020-05-01 | Stop reason: SDUPTHER

## 2020-03-25 NOTE — TELEPHONE ENCOUNTER
Estevan Aly called requesting a refill of the below medication which has been pended for you:     Requested Prescriptions     Pending Prescriptions Disp Refills    valACYclovir (VALTREX) 1 g tablet [Pharmacy Med Name: VALACYCLOVIR HCL 1GM TABS] 4 tablet 6     Sig: TAKE TWO TABLETS TWO TIMES A DAY FOR 1 DAYS (TOTAL OF 4 TABLETS PER COURSE OF TREATMENT)       Last Appointment Date: 2/19/2020  Next Appointment Date: 4/29/2020    Allergies   Allergen Reactions    Adhesive Tape     Bactrim [Sulfamethoxazole-Trimethoprim] Rash

## 2020-04-27 ENCOUNTER — HOSPITAL ENCOUNTER (OUTPATIENT)
Dept: LAB | Age: 65
Discharge: HOME | End: 2020-04-27
Payer: COMMERCIAL

## 2020-04-27 LAB
ABSOLUTE EOS #: 0.45 K/UL (ref 0–0.44)
ABSOLUTE IMMATURE GRANULOCYTE: <0.03 K/UL (ref 0–0.3)
ABSOLUTE LYMPH #: 2.74 K/UL (ref 1.1–3.7)
ABSOLUTE MONO #: 0.64 K/UL (ref 0.1–1.2)
ALBUMIN SERPL-MCNC: 4.5 G/DL (ref 3.5–5.2)
ALBUMIN/GLOBULIN RATIO: 1.3 (ref 1–2.5)
ALP BLD-CCNC: 91 U/L (ref 35–104)
ALT SERPL-CCNC: 40 U/L (ref 5–33)
ANION GAP SERPL CALCULATED.3IONS-SCNC: 12 MMOL/L (ref 9–17)
AST SERPL-CCNC: 31 U/L
BASOPHILS # BLD: 1 % (ref 0–2)
BASOPHILS ABSOLUTE: 0.05 K/UL (ref 0–0.2)
BILIRUB SERPL-MCNC: 1.42 MG/DL (ref 0.3–1.2)
BUN BLDV-MCNC: 15 MG/DL (ref 8–23)
BUN/CREAT BLD: 21 (ref 9–20)
CALCIUM SERPL-MCNC: 9.4 MG/DL (ref 8.6–10.4)
CHLORIDE BLD-SCNC: 106 MMOL/L (ref 98–107)
CHOLESTEROL/HDL RATIO: 3.3
CHOLESTEROL: 175 MG/DL
CO2: 26 MMOL/L (ref 20–31)
CREAT SERPL-MCNC: 0.71 MG/DL (ref 0.5–0.9)
DIFFERENTIAL TYPE: ABNORMAL
EOSINOPHILS RELATIVE PERCENT: 6 % (ref 1–4)
ESTIMATED AVERAGE GLUCOSE: 108 MG/DL
GFR AFRICAN AMERICAN: >60 ML/MIN
GFR NON-AFRICAN AMERICAN: >60 ML/MIN
GFR SERPL CREATININE-BSD FRML MDRD: ABNORMAL ML/MIN/{1.73_M2}
GFR SERPL CREATININE-BSD FRML MDRD: ABNORMAL ML/MIN/{1.73_M2}
GLUCOSE BLD-MCNC: 112 MG/DL (ref 70–99)
HBA1C MFR BLD: 5.4 % (ref 4.8–5.9)
HCT VFR BLD CALC: 41.6 % (ref 36.3–47.1)
HDLC SERPL-MCNC: 53 MG/DL
HEMOGLOBIN: 13.1 G/DL (ref 11.9–15.1)
IMMATURE GRANULOCYTES: 0 %
LDL CHOLESTEROL: 97 MG/DL (ref 0–130)
LYMPHOCYTES # BLD: 36 % (ref 24–43)
MCH RBC QN AUTO: 27.2 PG (ref 25.2–33.5)
MCHC RBC AUTO-ENTMCNC: 31.5 G/DL (ref 25.2–33.5)
MCV RBC AUTO: 86.5 FL (ref 82.6–102.9)
MONOCYTES # BLD: 8 % (ref 3–12)
NRBC AUTOMATED: 0 PER 100 WBC
PDW BLD-RTO: 13.2 % (ref 11.8–14.4)
PLATELET # BLD: 342 K/UL (ref 138–453)
PLATELET ESTIMATE: ABNORMAL
PMV BLD AUTO: 8.5 FL (ref 8.1–13.5)
POTASSIUM SERPL-SCNC: 4.2 MMOL/L (ref 3.7–5.3)
RBC # BLD: 4.81 M/UL (ref 3.95–5.11)
RBC # BLD: ABNORMAL 10*6/UL
SEG NEUTROPHILS: 49 % (ref 36–65)
SEGMENTED NEUTROPHILS ABSOLUTE COUNT: 3.79 K/UL (ref 1.5–8.1)
SODIUM BLD-SCNC: 144 MMOL/L (ref 135–144)
TOTAL PROTEIN: 7.9 G/DL (ref 6.4–8.3)
TRIGL SERPL-MCNC: 124 MG/DL
VLDLC SERPL CALC-MCNC: NORMAL MG/DL (ref 1–30)
WBC # BLD: 7.7 K/UL (ref 3.5–11.3)
WBC # BLD: ABNORMAL 10*3/UL

## 2020-04-27 PROCEDURE — 83036 HEMOGLOBIN GLYCOSYLATED A1C: CPT

## 2020-04-27 PROCEDURE — 80053 COMPREHEN METABOLIC PANEL: CPT

## 2020-04-27 PROCEDURE — 36415 COLL VENOUS BLD VENIPUNCTURE: CPT

## 2020-04-27 PROCEDURE — 80061 LIPID PANEL: CPT

## 2020-04-27 PROCEDURE — 85025 COMPLETE CBC W/AUTO DIFF WBC: CPT

## 2020-04-29 ENCOUNTER — OFFICE VISIT (OUTPATIENT)
Dept: FAMILY MEDICINE CLINIC | Age: 65
End: 2020-04-29
Payer: COMMERCIAL

## 2020-04-29 VITALS
WEIGHT: 235 LBS | TEMPERATURE: 97.9 F | SYSTOLIC BLOOD PRESSURE: 136 MMHG | HEIGHT: 64 IN | DIASTOLIC BLOOD PRESSURE: 80 MMHG | HEART RATE: 72 BPM | BODY MASS INDEX: 40.12 KG/M2

## 2020-04-29 PROCEDURE — 99396 PREV VISIT EST AGE 40-64: CPT | Performed by: FAMILY MEDICINE

## 2020-04-29 PROCEDURE — 90471 IMMUNIZATION ADMIN: CPT | Performed by: FAMILY MEDICINE

## 2020-04-29 PROCEDURE — 90750 HZV VACC RECOMBINANT IM: CPT | Performed by: FAMILY MEDICINE

## 2020-04-29 RX ORDER — LORATADINE 10 MG/1
10 TABLET ORAL DAILY
COMMUNITY
End: 2022-07-28

## 2020-04-29 ASSESSMENT — ENCOUNTER SYMPTOMS
EYE REDNESS: 0
CONSTIPATION: 0
VOMITING: 0
ABDOMINAL PAIN: 0
EYE DISCHARGE: 0
TROUBLE SWALLOWING: 0
DIARRHEA: 0
SORE THROAT: 0
WHEEZING: 0
SHORTNESS OF BREATH: 0
COUGH: 0
SINUS PRESSURE: 0
NAUSEA: 0
RHINORRHEA: 0

## 2020-04-29 NOTE — PROGRESS NOTES
diarrhea, nausea and vomiting. Genitourinary: Negative for dysuria, flank pain, frequency and urgency. Musculoskeletal: Negative for arthralgias, myalgias and neck pain. Skin: Negative for rash and wound. Allergic/Immunologic: Negative for environmental allergies. Neurological: Negative for dizziness, weakness, light-headedness and headaches. Hematological: Negative for adenopathy. Psychiatric/Behavioral: Negative. Prior to Visit Medications    Medication Sig Taking? Authorizing Provider   loratadine (CLARITIN) 10 MG tablet Take 10 mg by mouth daily Yes Historical Provider, MD   valACYclovir (VALTREX) 1 g tablet TAKE TWO TABLETS TWO TIMES A DAY FOR 1 DAYS (TOTAL OF 4 TABLETS PER COURSE OF TREATMENT) Yes Javi Acuña DO   omeprazole (PRILOSEC) 20 MG delayed release capsule TAKE 1 CAPSULE BY MOUTH ONE TIME DAILY Yes Tyesha Rodriguez MD   ibuprofen (ADVIL;MOTRIN) 600 MG tablet Take 600 mg by mouth every 8 hours as needed for Pain. Yes Historical Provider, MD   Multiple Vitamins-Minerals (MULTIVITAMIN PO) Take 1 tablet by mouth daily. Yes Historical Provider, MD        Allergies   Allergen Reactions    Adhesive Tape     Bactrim [Sulfamethoxazole-Trimethoprim] Rash       Past Medical History:   Diagnosis Date    Allergic rhinitis     Gallstones     History of chicken pox     HSIL (high grade squamous intraepithelial lesion) on Pap smear of cervix      with HPV noted on path report.   Did have Laser Cone procedure       Past Surgical History:   Procedure Laterality Date    BLADDER SUSPENSION      Dr Tiffanie Dhaliwal BIOPSY  1999    Laser cone due to HSIL     SECTION      CHOLECYSTECTOMY      COLONOSCOPY  10/17/2016    hyperplastic  polyp, Dr. Candy Mota, VAGINAL  2014    Dr Homer Bey;  with BSO, external [facility] culdoplasty with uterosacral ligament suspension, anterior colprrhapy, cystourethyocopy, posterior colporrhapy    SALPINGO-OOPHORECTOMY Bilateral 05/06/2014    Dr Palma Xie ENDOSCOPY  10/17/2016    gastritis, superficial ulcer in esophagus, Dr. Shetty Camera Marital status:      Spouse name: Not on file    Number of children: Not on file    Years of education: Not on file    Highest education level: Not on file   Occupational History    Not on file   Social Needs    Financial resource strain: Not on file    Food insecurity     Worry: Not on file     Inability: Not on file    Transportation needs     Medical: Not on file     Non-medical: Not on file   Tobacco Use    Smoking status: Never Smoker    Smokeless tobacco: Never Used   Substance and Sexual Activity    Alcohol use: No    Drug use: No    Sexual activity: Not on file   Lifestyle    Physical activity     Days per week: Not on file     Minutes per session: Not on file    Stress: Not on file   Relationships    Social connections     Talks on phone: Not on file     Gets together: Not on file     Attends [employer] service: Not on file     Active member of club or organization: Not on file     Attends meetings of clubs or organizations: Not on file     Relationship status: Not on file    Intimate partner violence     Fear of current or ex partner: Not on file     Emotionally abused: Not on file     Physically abused: Not on file     Forced sexual activity: Not on file   Other Topics Concern    Not on file   Social History Narrative    Not on file        Family History   Problem Relation Age of Onset    Cancer Mother         PANCREATIC    Cancer Sister         BREAST       ADVANCE DIRECTIVE: N, Not Received    Vitals:    04/29/20 0917   BP: 136/80   Site: Left Upper Arm   Position: Sitting   Cuff Size: Large Adult   Pulse: 72   Temp: 97.9 °F (36.6 °C)   TempSrc: Tympanic   Weight: 235 lb (106.6 kg)   Height: 5' 4.49\" (1.638 m)     Estimated body mass index is 39.73 Answer:   screening    Zoster recombinant [facility])    Comprehensive Metabolic Panel     Standing Status:   Future     Standing Expiration Date:   10/26/2021    Lipid Panel     Standing Status:   Future     Standing Expiration Date:   10/26/2021     Order Specific Question:   Is Patient Fasting?/# of Hours     Answer:   yes, 12    CBC Auto Differential     Standing Status:   Future     Standing Expiration Date:   10/26/2021    Hemoglobin A1C     Standing Status:   Future     Standing Expiration Date:   10/26/2021     Patient is to continue to follow a low-carb/low sugar/low-fat diet and increase exercise for optimal blood sugar and cholesterol control. Continue on her current medical therapy. No plans are made at this time. Patient is to return to my office annually for routine general physical exam and follow-up of her chronic her symptoms arise. (Please note that portions of this note were completed with a voice recognition program. Efforts were made to edit the dictations but occasionally words are mis-transcribed.)        Return in about 1 year (around 4/29/2021). An electronic signature was used to authenticate this note.     --Alfonso Meza DO on 4/29/2020 at 10:10 AM

## 2020-04-30 ENCOUNTER — PATIENT MESSAGE (OUTPATIENT)
Dept: FAMILY MEDICINE CLINIC | Age: 65
End: 2020-04-30

## 2020-04-30 RX ORDER — VALACYCLOVIR HYDROCHLORIDE 1 G/1
TABLET, FILM COATED ORAL
Qty: 4 TABLET | Refills: 2 | OUTPATIENT
Start: 2020-04-30

## 2020-04-30 NOTE — TELEPHONE ENCOUNTER
Concetta Wang called requesting a refill of the below medication which has been pended for you: Script was sent 3/25/2020    Requested Prescriptions     Refused Prescriptions Disp Refills    valACYclovir (VALTREX) 1 g tablet [Pharmacy Med Name: VALACYCLOVIR HCL 1GM TABS] 4 tablet 2     Sig: TAKE TWO TABLETS BY MOUTH TWICE A DAY FOR 1 DAY. TOTAL OF 4 TABLETS PER COURSE OF TREATMENT.        Last Appointment Date: 4/29/2020  Next Appointment Date: Visit date not found    Allergies   Allergen Reactions    Adhesive Tape     Bactrim [Sulfamethoxazole-Trimethoprim] Rash

## 2020-05-01 RX ORDER — VALACYCLOVIR HYDROCHLORIDE 1 G/1
TABLET, FILM COATED ORAL
Qty: 12 TABLET | Refills: 1 | Status: SHIPPED | OUTPATIENT
Start: 2020-05-01 | End: 2020-11-02

## 2020-05-01 NOTE — TELEPHONE ENCOUNTER
From: Jenna Watkins  To: Rachelle Contreras DO  Sent: 2020 4:19 PM EDT  Subject: Non-Urgent Medical Question    Hello. I started getting another stupid cold sore. Got one two weeks ago and used the medication, but yesterday when the cold sore started I had 4 pills. I am all out again. Maranda Diaz took the bottle in to get refilled but it was . Anyway, could I get a refill for these stupid cold sores? Let me know. Maranda Diaz works tomorrow again.    Luanne [facility]

## 2020-06-24 ENCOUNTER — HOSPITAL ENCOUNTER (OUTPATIENT)
Dept: MAMMOGRAPHY | Age: 65
Discharge: HOME | End: 2020-06-26
Payer: COMMERCIAL

## 2020-07-09 ENCOUNTER — PATIENT MESSAGE (OUTPATIENT)
Dept: FAMILY MEDICINE CLINIC | Age: 65
End: 2020-07-09

## 2020-07-10 ENCOUNTER — VIRTUAL VISIT (OUTPATIENT)
Dept: FAMILY MEDICINE CLINIC | Age: 65
End: 2020-07-10
Payer: COMMERCIAL

## 2020-07-10 PROCEDURE — 99213 OFFICE O/P EST LOW 20 MIN: CPT | Performed by: FAMILY MEDICINE

## 2020-07-10 RX ORDER — DOXYCYCLINE HYCLATE 100 MG
100 TABLET ORAL 2 TIMES DAILY
Qty: 20 TABLET | Refills: 0 | Status: SHIPPED | OUTPATIENT
Start: 2020-07-10 | End: 2020-11-17

## 2020-07-10 ASSESSMENT — ENCOUNTER SYMPTOMS: COLOR CHANGE: 0

## 2020-07-10 NOTE — TELEPHONE ENCOUNTER
From: Lj Welsh  To: John Jiménez DO  Sent: 7/9/2020 10:58 PM EDT  Subject: Non-Urgent Medical Question    Nabil Jacques. I was suppose to get my mammogram but I have to reschedule it. I had a boil on my breast and it would have shown a bump so they did not want to do it. The boil is a lot smaller but now I have another one on the other breast. I an doing heat compresses and washing with antibacterial soap. I know that I have been sweating a lot and I believe that is why I have these boils. I can send a picture if need. I was hoping that I could get some medicine. I think it would help it to heal sooner. I know it is a boil and nothing different. Let me know if I can get some medicine.  Thanks Ismael Guzman

## 2020-07-10 NOTE — PROGRESS NOTES
of chicken pox     HSIL (high grade squamous intraepithelial lesion) on Pap smear of cervix      with HPV noted on path report. Did have Laser Cone procedure   ,   Past Surgical History:   Procedure Laterality Date    BLADDER SUSPENSION      Dr Saeed Older  1999    Laser cone due to HSIL     SECTION      CHOLECYSTECTOMY      COLONOSCOPY  10/17/2016    hyperplastic  polyp, Dr. Martel Smaller, VAGINAL  2014    Dr Harvey Petit; VH with BSO, external [place] culdoplasty with uterosacral ligament suspension, anterior colprrhapy, cystourethyocopy, posterior colporrhapy    SALPINGO-OOPHORECTOMY Bilateral 2014    Dr Dipesh Braun  10/17/2016    gastritis, superficial ulcer in esophagus, Dr. Ulysses Huber       Physical Exam  Vitals signs and nursing note reviewed. Constitutional:       General: She is not in acute distress. Appearance: Normal appearance. HENT:      Head: Normocephalic and atraumatic. Right Ear: External ear normal.      Left Ear: External ear normal.      Nose: Nose normal. No congestion or rhinorrhea. Mouth/Throat:      Mouth: Mucous membranes are moist.   Eyes:      General:         Right eye: No discharge. Left eye: No discharge. Extraocular Movements: Extraocular movements intact. Conjunctiva/sclera: Conjunctivae normal.   Neck:      Musculoskeletal: Normal range of motion. Pulmonary:      Effort: Pulmonary effort is normal.   Skin:     Findings: No erythema or rash. Neurological:      Mental Status: She is alert and oriented to person, place, and time. Mental status is at baseline. ASSESSMENT/PLAN:  Encounter Diagnosis   Name Primary?     Abscess of breast Yes     Orders Placed This Encounter   Medications    doxycycline hyclate (VIBRA-TABS) 100 MG tablet     Sig: Take 1 tablet by mouth 2 times daily     Dispense:  20 tablet Refill:  0     Likely recurrent due to sweating and moisture. Will treat with doxycycline and did recommend use of spray antiperspirant to the undersurface of the breasts. Also would recommend use of medicated powder when working in hot temperatures. Can continue to use warm compresses to area as needed. Return  if no improvement in symptoms or if any further symptoms arise. Zac Castillo is a 62 y.o. female being evaluated by a Virtual Visit (video visit) encounter to address concerns as mentioned above. A caregiver was present when appropriate. Due to this being a TeleHealth encounter (During [facility]- public health emergency), evaluation of the following organ systems was limited: Vitals/Constitutional/EENT/Resp/CV/GI//MS/Neuro/Skin/Heme-Lymph-Imm. Pursuant to the emergency declaration under the [address] [place] [address], [address] [place] [address] authority and the [employer] and [employer] Act, this Virtual Visit was conducted with patient's (and/or legal guardian's) consent, to reduce the patient's risk of exposure to COVID-19 and provide necessary medical care. The patient (and/or legal guardian) has also been advised to contact this office for worsening conditions or problems, and seek emergency medical treatment and/or call 911 if deemed necessary. Patient identification was verified at the start of the visit: Yes    Total time spent on this encounter: Not billed by time    Services were provided through a video synchronous discussion virtually to substitute for in-person clinic visit. Patient was in their home setting on their mobile device and I was in my office on a secured video interface. --Marguerite Torres DO on 7/10/2020 at 12:26 PM    An electronic signature was used to authenticate this note.

## 2020-11-02 RX ORDER — VALACYCLOVIR HYDROCHLORIDE 1 G/1
TABLET, FILM COATED ORAL
Qty: 12 TABLET | Refills: 1 | Status: SHIPPED | OUTPATIENT
Start: 2020-11-02 | End: 2021-12-07

## 2020-11-02 NOTE — TELEPHONE ENCOUNTER
Helena Nancy called requesting a refill of the below medication which has been pended for you:     Requested Prescriptions     Pending Prescriptions Disp Refills    valACYclovir (VALTREX) 1 g tablet [Pharmacy Med Name: VALACYCLOVIR HCL 1GM TABS] 12 tablet 1     Sig: TAKE TWO TABLETS BY MOUTH TWO TIMES A DAY FOR 1 DAY.  (TOTAL OF 4 TABLETS PER COURSE OF TREATMENT)       Last Appointment Date: 7/10/2020  Next Appointment Date: 4/30/2021    Allergies   Allergen Reactions    Adhesive Tape     Bactrim [Sulfamethoxazole-Trimethoprim] Rash

## 2020-11-15 ENCOUNTER — PATIENT MESSAGE (OUTPATIENT)
Dept: FAMILY MEDICINE CLINIC | Age: 65
End: 2020-11-15

## 2020-11-17 ENCOUNTER — HOSPITAL ENCOUNTER (OUTPATIENT)
Dept: LAB | Age: 65
Discharge: HOME | End: 2020-11-17
Payer: COMMERCIAL

## 2020-11-17 ENCOUNTER — VIRTUAL VISIT (OUTPATIENT)
Dept: FAMILY MEDICINE CLINIC | Age: 65
End: 2020-11-17
Payer: COMMERCIAL

## 2020-11-17 LAB
-: ABNORMAL
AMORPHOUS: ABNORMAL
BACTERIA: ABNORMAL
BILIRUBIN URINE: NEGATIVE
CASTS UA: ABNORMAL /LPF (ref 0–2)
COLOR: ABNORMAL
COMMENT UA: ABNORMAL
CRYSTALS, UA: ABNORMAL /HPF
EPITHELIAL CELLS UA: ABNORMAL /HPF (ref 0–5)
GLUCOSE URINE: NEGATIVE
KETONES, URINE: NEGATIVE
LEUKOCYTE ESTERASE, URINE: NEGATIVE
MUCUS: ABNORMAL
NITRITE, URINE: NEGATIVE
OTHER OBSERVATIONS UA: ABNORMAL
PH UA: 6 (ref 5–6)
PROTEIN UA: NEGATIVE
RBC UA: ABNORMAL /HPF (ref 0–4)
RENAL EPITHELIAL, UA: ABNORMAL /HPF
SPECIFIC GRAVITY UA: 1.02 (ref 1.01–1.02)
TRICHOMONAS: ABNORMAL
TURBIDITY: ABNORMAL
URINE HGB: NEGATIVE
UROBILINOGEN, URINE: NORMAL
WBC UA: ABNORMAL /HPF (ref 0–4)
YEAST: ABNORMAL

## 2020-11-17 PROCEDURE — 87088 URINE BACTERIA CULTURE: CPT

## 2020-11-17 PROCEDURE — 81001 URINALYSIS AUTO W/SCOPE: CPT

## 2020-11-17 PROCEDURE — 87086 URINE CULTURE/COLONY COUNT: CPT

## 2020-11-17 PROCEDURE — 87186 SC STD MICRODIL/AGAR DIL: CPT

## 2020-11-17 PROCEDURE — 99213 OFFICE O/P EST LOW 20 MIN: CPT | Performed by: FAMILY MEDICINE

## 2020-11-17 ASSESSMENT — ENCOUNTER SYMPTOMS
BACK PAIN: 1
CONSTIPATION: 0
NAUSEA: 0
ABDOMINAL PAIN: 0
DIARRHEA: 0
VOMITING: 0

## 2020-11-17 NOTE — PROGRESS NOTES
2020    TELEHEALTH EVALUATION -- Audio/Visual (During CESSX-94 public health emergency)    HPI:    Marina Hernandez (:  1955) has requested an audio/video evaluation for the following concern(s):    Patient is seen today via a video visit for evaluation of urinary symptoms. Patient had just noticed recently that her urine was very dark in color and had an odor. She is not having any burning with urination. No frequency. She  did have a little bit of low back pain but not severe. Was worried about possible diabetes. She has not seen any blood in her urine. Otherwise no other related symptoms today. Just wanted to check to make sure there was no abnormality to her urine that was causing the issue. She thinks that she hydrates well but maybe perhaps could drink a little bit more fluid. .  Patient's recent lab reports are as follows:    Results for orders placed or performed during the hospital encounter of 20   Urinalysis With Microscopic   Result Value Ref Range    Color, UA NOT REPORTED YELLOW    Turbidity UA NOT REPORTED CLEAR    Glucose, Ur NEGATIVE NEGATIVE    Bilirubin Urine NEGATIVE NEGATIVE    Ketones, Urine NEGATIVE NEGATIVE    Specific Gravity, UA 1.025 1.010 - 1.025    Urine Hgb NEGATIVE NEGATIVE    pH, UA 6.0 5.0 - 6.0    Protein, UA NEGATIVE NEGATIVE    Urobilinogen, Urine Normal Normal    Nitrite, Urine NEGATIVE NEGATIVE    Leukocyte Esterase, Urine NEGATIVE NEGATIVE    Urinalysis Comments NOT REPORTED     -          WBC, UA 0 TO 4 0 - 4 /HPF    RBC, UA 0 TO 4 0 - 4 /HPF    Casts UA NOT REPORTED 0 - 2 /LPF    Crystals, UA NOT REPORTED None /HPF    Epithelial Cells UA 5 TO 10 0 - 5 /HPF    Renal Epithelial, UA NOT REPORTED 0 /HPF    Bacteria, UA 4+ (A) None    Mucus, UA NOT REPORTED None    Trichomonas, UA NOT REPORTED None    Amorphous, UA NOT REPORTED None    Other Observations UA NOT REPORTED NOT REQ.     Yeast, UA NOT REPORTED None      Other review of systems are as noted HSIL     SECTION      CHOLECYSTECTOMY      COLONOSCOPY  10/17/2016    hyperplastic  polyp, Dr. Natalio Abad, VAGINAL  2014    Dr Anu Powell; VH with BSO, external [place] culdoplasty with uterosacral ligament suspension, anterior colprrhapy, cystourethyocopy, posterior colporrhapy    SALPINGO-OOPHORECTOMY Bilateral 2014    Dr Elizabeth Cano  10/17/2016    gastritis, superficial ulcer in esophagus, Dr. Rosanne Perez       Physical Exam  Vitals signs and nursing note reviewed. Constitutional:       General: She is not in acute distress. Appearance: Normal appearance. HENT:      Head: Normocephalic and atraumatic. Right Ear: External ear normal.      Left Ear: External ear normal.      Nose: Nose normal. No congestion or rhinorrhea. Mouth/Throat:      Mouth: Mucous membranes are moist.   Eyes:      General:         Right eye: No discharge. Left eye: No discharge. Extraocular Movements: Extraocular movements intact. Conjunctiva/sclera: Conjunctivae normal.   Neck:      Musculoskeletal: Normal range of motion. Pulmonary:      Effort: Pulmonary effort is normal.   Skin:     Findings: No erythema or rash. Neurological:      Mental Status: She is alert and oriented to person, place, and time. Mental status is at baseline. ASSESSMENT/PLAN:    Encounter Diagnosis   Name Primary?  Malodorous urine Yes     Patient had obtained a urinalysis prior to her video visit. It did not show any significant abnormality other than 4+ bacteria. Will await culture. Likely this is more of a concentrated urine and I did suggest to patient that she hydrate well with water over the next couple of days to see if this will clear. She has been taking vitamin C so this also could be causing some changes to her urine color and possibly odor. Return  if no improvement in symptoms or if any further symptoms arise.     (Please note that portions of this note were completed with a voice recognition program. Efforts were made to edit the dictations but occasionally words are mis-transcribed.)        No follow-ups on file. Saul Kamara is a 62 y.o. female being evaluated by a Virtual Visit (video visit) encounter to address concerns as mentioned above. A caregiver was present when appropriate. Due to this being a TeleHealth encounter (During PJBMZ-88 public health emergency), evaluation of the following organ systems was limited: Vitals/Constitutional/EENT/Resp/CV/GI//MS/Neuro/Skin/Heme-Lymph-Imm. Pursuant to the emergency declaration under the [address] [place] [address] authority and the [employer] and [employer] Act, this Virtual Visit was conducted with patient's (and/or legal guardian's) consent, to reduce the patient's risk of exposure to COVID-19 and provide necessary medical care. The patient (and/or legal guardian) has also been advised to contact this office for worsening conditions or problems, and seek emergency medical treatment and/or call 911 if deemed necessary. Patient identification was verified at the start of the visit: Yes    Total time spent on this encounter: Not billed by time    Services were provided through a video synchronous discussion virtually to substitute for in-person clinic visit. Patient was in their home setting on their mobile device and I was in my office on a secured video interface. --George Gutierrez DO on 11/17/2020 at 9:44 AM    An electronic signature was used to authenticate this note.

## 2020-11-18 LAB
CULTURE: ABNORMAL
Lab: ABNORMAL
SPECIMEN DESCRIPTION: ABNORMAL

## 2020-11-19 ENCOUNTER — PATIENT MESSAGE (OUTPATIENT)
Dept: FAMILY MEDICINE CLINIC | Age: 65
End: 2020-11-19

## 2020-11-19 ENCOUNTER — TELEPHONE (OUTPATIENT)
Dept: FAMILY MEDICINE CLINIC | Age: 65
End: 2020-11-19

## 2020-11-19 RX ORDER — NITROFURANTOIN 25; 75 MG/1; MG/1
100 CAPSULE ORAL 2 TIMES DAILY
Qty: 14 CAPSULE | Refills: 0 | Status: SHIPPED | OUTPATIENT
Start: 2020-11-19 | End: 2020-11-26

## 2020-11-19 NOTE — TELEPHONE ENCOUNTER
Notified patient of uti via personal voice mail message. Informed we sent antibiotic script to the [facility] pharmacy. To contact the office with any questions or concerns.

## 2020-11-19 NOTE — TELEPHONE ENCOUNTER
----- Message from Chip Dick DO sent at 11/19/2020  7:53 AM EST -----  Notify patient that her culture actually did show infection. Would treat with Macrobid 100mg bid for 7 days.

## 2020-11-19 NOTE — TELEPHONE ENCOUNTER
From: Aquiles Garcia  To: Michael Meehan DO  Sent: 11/19/2020 11:41 AM EST  Subject: Prescription Question    Jono Emerson a call that I need a prescription. Can you please send to Pharmacy in the clinic and Hubert Smith can  for me?

## 2021-01-02 ENCOUNTER — OFFICE VISIT (OUTPATIENT)
Dept: PRIMARY CARE CLINIC | Age: 66
End: 2021-01-02
Payer: COMMERCIAL

## 2021-01-02 ENCOUNTER — HOSPITAL ENCOUNTER (OUTPATIENT)
Age: 66
Setting detail: SPECIMEN
Discharge: HOME | End: 2021-01-02
Payer: COMMERCIAL

## 2021-01-02 VITALS
WEIGHT: 237.4 LBS | RESPIRATION RATE: 16 BRPM | BODY MASS INDEX: 40.53 KG/M2 | TEMPERATURE: 97.3 F | HEIGHT: 64 IN | OXYGEN SATURATION: 97 % | HEART RATE: 83 BPM | SYSTOLIC BLOOD PRESSURE: 132 MMHG | DIASTOLIC BLOOD PRESSURE: 88 MMHG

## 2021-01-02 DIAGNOSIS — J06.9 VIRAL UPPER RESPIRATORY TRACT INFECTION: ICD-10-CM

## 2021-01-02 DIAGNOSIS — J06.9 VIRAL UPPER RESPIRATORY TRACT INFECTION: Primary | ICD-10-CM

## 2021-01-02 PROCEDURE — U0003 INFECTIOUS AGENT DETECTION BY NUCLEIC ACID (DNA OR RNA); SEVERE ACUTE RESPIRATORY SYNDROME CORONAVIRUS 2 (SARS-COV-2) (CORONAVIRUS DISEASE [COVID-19]), AMPLIFIED PROBE TECHNIQUE, MAKING USE OF HIGH THROUGHPUT TECHNOLOGIES AS DESCRIBED BY CMS-2020-01-R: HCPCS

## 2021-01-02 PROCEDURE — 99213 OFFICE O/P EST LOW 20 MIN: CPT | Performed by: PHYSICIAN ASSISTANT

## 2021-01-02 ASSESSMENT — ENCOUNTER SYMPTOMS
SORE THROAT: 1
GASTROINTESTINAL NEGATIVE: 1
COUGH: 1
TROUBLE SWALLOWING: 0
RHINORRHEA: 1
CHEST TIGHTNESS: 0
SINUS PRESSURE: 1
SINUS PAIN: 1

## 2021-01-02 ASSESSMENT — PATIENT HEALTH QUESTIONNAIRE - PHQ9
SUM OF ALL RESPONSES TO PHQ QUESTIONS 1-9: 0
SUM OF ALL RESPONSES TO PHQ QUESTIONS 1-9: 0

## 2021-01-02 NOTE — PATIENT INSTRUCTIONS
Will notify you of COVID test results as soon as available. You should isoloate at home in an area away from family. If you must be around family members, please wear a mask. Quarantine at home until result is available. This means do not go to work/school, attend family gatherings, or invite others to your home until you know your test results. Patient Education        Learning About Coronavirus [phone])  Coronavirus [phone]): Overview  What is coronavirus ([id number]-[id number])? The coronavirus disease (COVID-19) is caused by a virus. It is an illness that was first found in December 2019. It has since spread worldwide. The virus can cause fever, cough, and trouble breathing. In severe cases, it can cause pneumonia and make it hard to breathe without help. It can cause death. This virus spreads person-to-person through droplets from coughing and sneezing. It can also spread when you are close to someone who is infected. And it can spread when you touch something that has the virus on it, such as a doorknob or a tabletop. Coronaviruses are a large group of viruses. They cause the common cold. They also cause more serious illnesses like Middle East respiratory syndrome (MERS) and severe acute respiratory syndrome (SARS). COVID-19 is caused by a novel coronavirus. That means it's a new type that has not been seen in people before. How is COVID-19 treated? Mild illness can be treated at home, but more serious illness needs to be treated in the hospital. Treatment may include medicines to reduce symptoms, plus breathing support such as oxygen therapy or a ventilator. Other treatments, such as antiviral medicines, may help people who have COVID-19. What can you do to protect yourself from COVID-19? The best way to protect yourself from getting sick is to:  · Avoid areas where there is an outbreak. · Avoid contact with people who may be infected. · Avoid crowds and try to stay at least 6 feet away from other people. · Wash your hands often, especially after you cough or sneeze. Use soap and water, and scrub for at least 20 seconds. If soap and water aren't available, use an alcohol-based hand . · Avoid touching your mouth, nose, and eyes. What can you do to avoid spreading the virus to others? To help avoid spreading the virus to others:  · Wash your hands often with soap or alcohol-based hand sanitizers. · Cover your mouth with a tissue when you cough or sneeze. Then throw the tissue in the trash. · Use a disinfectant to clean things that you touch often. These include doorknobs, remote controls, phones, and handles on your refrigerator and microwave. And don't forget countertops, tabletops, bathrooms, and computer keyboards. · Wear a cloth face cover if you have to go to public areas. If you know or suspect that you have COVID-19:  · Stay home. Don't go to school, work, or public areas. And don't use public transportation, ride-shares, or taxis unless you have no choice. · Leave your home only if you need to get medical care or testing. But call the doctor's office first so they know you're coming. And wear a face cover. · Limit contact with people in your home. If possible, stay in a separate bedroom and use a separate bathroom. · Wear a face cover whenever you're around other people. It can help stop the spread of the virus when you cough or sneeze. · Clean and disinfect your home every day. Use household  and disinfectant wipes or sprays. Take special care to clean things that you grab with your hands. · Self-isolate until it's safe to be around others again. ? If you have symptoms, it's safe when you haven't had a fever for 3 days and your symptoms have improved and it's been at least 10 days since your symptoms started. ? If you were exposed to the virus but don't have symptoms, it's safe to be around others 14 days after exposure. ? Talk to your doctor about whether you also need testing, especially if you have a weakened immune system. When to call for help  Call 911 anytime you think you may need emergency care. For example, call if:  · You have severe trouble breathing. (You can't talk at all.)  · You have constant chest pain or pressure. · You are severely dizzy or lightheaded. · You are confused or can't think clearly. · Your face and lips have a blue color. · You passed out (lost consciousness) or are very hard to wake up. Call your doctor now if you develop symptoms such as:  · Shortness of breath. · Fever. · Cough. If you need to get care, call ahead to the doctor's office for instructions before you go. Make sure you wear a face cover to prevent exposing other people to the virus. Where can you get the latest information? The following health organizations are tracking and studying this virus. Their websites contain the most up-to-date information. Rosie Loera also learn what to do if you think you may have been exposed to the virus. · U.S. Centers for Disease Control and Prevention (CDC): The CDC provides updated news about the disease and travel advice. The website also tells you how to prevent the spread of infection. www.cdc.gov  · World Health Organization [facility]): WHO offers information about the virus outbreaks. WHO also has travel advice. www.who.int  Current as of: July 10, 2020               Content Version: 12.6  © 2006-2020 [employer]. Care instructions adapted under license by [facility] ([facility]). If you have questions about a medical condition or this instruction, always ask your healthcare professional. [address] any warranty or liability for your use of this information.

## 2021-01-02 NOTE — PROGRESS NOTES
[facility]    Subjective:      Patient ID: Sharri Fatima is a 60 y.o. y.o. female. Patient is seen due to cold sx for the past 6 days. Has cold sx. Smell is off some. Taste is ok. Had a fever the 2nd night of 99 degrees and sweats. Has a slight cough. Has post nasal drip and throat is irritated too. Has taken vitamins, motrin and possibly afrin. Sudafed too. Initially had runny nose and sneezing constant the first few days and took claritin. It helped some. 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 Did have Laser Cone procedure       Past Surgical History:   Procedure Laterality Date    BLADDER SUSPENSION      Dr Osei Holguin  1999    Laser cone due to HSIL     SECTION      CHOLECYSTECTOMY      COLONOSCOPY  10/17/2016    hyperplastic  polyp, Dr. Steve Duran, VAGINAL  2014    Dr Veena Morgan; VH with BSO, external Merida-Cowan culdoplasty with uterosacral ligament suspension, anterior colprrhapy, cystourethyocopy, posterior colporrhapy    SALPINGO-OOPHORECTOMY Bilateral 2014    Dr Leonel Cortez  10/17/2016    gastritis, superficial ulcer in esophagus, Dr. Anum Zhang       Family History   Problem Relation Age of Onset    Cancer Mother         PANCREATIC    Cancer Sister         BREAST       Allergies   Allergen Reactions    Adhesive Tape     Bactrim [Sulfamethoxazole-Trimethoprim] Rash       Current Outpatient Medications   Medication Sig Dispense Refill    Ascorbic Acid (VITAMIN C PO) Take 1 tablet by mouth daily      valACYclovir (VALTREX) 1 g tablet TAKE TWO TABLETS BY MOUTH TWO TIMES A DAY FOR 1 DAY.  (TOTAL OF 4 TABLETS PER COURSE OF TREATMENT) 12 tablet 1 Musculoskeletal: Normal range of motion and neck supple. No neck rigidity or muscular tenderness. Cardiovascular:      Rate and Rhythm: Normal rate and regular rhythm. Heart sounds: Normal heart sounds. No murmur. Pulmonary:      Effort: Pulmonary effort is normal.      Breath sounds: Normal breath sounds. No wheezing. Abdominal:      General: There is no distension. Palpations: Abdomen is soft. There is no mass. Tenderness: There is no abdominal tenderness. There is no guarding or rebound. Hernia: No hernia is present. Musculoskeletal:         General: No swelling, tenderness, deformity or signs of injury. Right lower leg: No edema. Left lower leg: No edema. Lymphadenopathy:      Cervical: No cervical adenopathy. Skin:     General: Skin is warm and dry. Findings: No erythema or rash. Neurological:      Mental Status: She is alert and oriented to person, place, and time. Sensory: No sensory deficit. Gait: Gait normal.   Psychiatric:         Mood and Affect: Mood normal.         Behavior: Behavior normal.         Thought Content: Thought content normal.           Assessment & Plan:     1. Viral upper respiratory tract infection    - COVID-19 Ambulatory;  Future      Fluids rest OTC medications prn  Answered her questions  Will be notified of results  Discussed quarantine with patient  Follow up not improving or worsens  Her left external ear was slightly irritated recommended cortaid to area let us know if not improving  LUDIN Telles  1/2/2021 10:46 AM EST    (Pleasenote that portions of this note were completed with a voice recognition program.Efforts were made to edit the dictations but occasionally words are mis-transcribed.)

## 2021-01-05 ENCOUNTER — PATIENT MESSAGE (OUTPATIENT)
Dept: FAMILY MEDICINE CLINIC | Age: 66
End: 2021-01-05

## 2021-01-06 LAB — SARS-COV-2, NAA: DETECTED

## 2021-02-23 RX ORDER — OMEPRAZOLE 20 MG/1
CAPSULE, DELAYED RELEASE ORAL
Qty: 90 CAPSULE | Refills: 3 | Status: SHIPPED | OUTPATIENT
Start: 2021-02-23

## 2021-03-08 ENCOUNTER — HOSPITAL ENCOUNTER (OUTPATIENT)
Dept: MAMMOGRAPHY | Age: 66
Discharge: HOME | End: 2021-03-10
Payer: COMMERCIAL

## 2021-03-08 DIAGNOSIS — Z12.31 VISIT FOR SCREENING MAMMOGRAM: ICD-10-CM

## 2021-03-08 DIAGNOSIS — R92.8 ABNORMAL MAMMOGRAM OF LEFT BREAST: Primary | ICD-10-CM

## 2021-03-08 PROCEDURE — 77063 BREAST TOMOSYNTHESIS BI: CPT

## 2021-03-12 ENCOUNTER — HOSPITAL ENCOUNTER (OUTPATIENT)
Dept: MAMMOGRAPHY | Age: 66
Discharge: HOME | End: 2021-03-14
Payer: COMMERCIAL

## 2021-03-12 ENCOUNTER — IMMUNIZATION (OUTPATIENT)
Dept: LAB | Age: 66
End: 2021-03-12
Payer: COMMERCIAL

## 2021-03-12 DIAGNOSIS — R92.8 ABNORMAL MAMMOGRAM OF LEFT BREAST: ICD-10-CM

## 2021-03-12 PROCEDURE — 0031A COVID-19, J&J VACCINE, PF, 0.5 ML DOSE: CPT | Performed by: INTERNAL MEDICINE

## 2021-03-12 PROCEDURE — 91303 COVID-19, J&J VACCINE, PF, 0.5 ML DOSE: CPT | Performed by: INTERNAL MEDICINE

## 2021-03-12 PROCEDURE — 77065 DX MAMMO INCL CAD UNI: CPT

## 2021-04-30 ENCOUNTER — HOSPITAL ENCOUNTER (OUTPATIENT)
Dept: LAB | Age: 66
Discharge: HOME | End: 2021-04-30
Payer: COMMERCIAL

## 2021-04-30 DIAGNOSIS — Z00.00 ROUTINE GENERAL MEDICAL EXAMINATION AT A HEALTH CARE FACILITY: ICD-10-CM

## 2021-04-30 DIAGNOSIS — R73.01 IMPAIRED FASTING GLUCOSE: ICD-10-CM

## 2021-04-30 LAB
ABSOLUTE EOS #: 0.54 K/UL (ref 0–0.44)
ABSOLUTE IMMATURE GRANULOCYTE: <0.03 K/UL (ref 0–0.3)
ABSOLUTE LYMPH #: 2.47 K/UL (ref 1.1–3.7)
ABSOLUTE MONO #: 0.54 K/UL (ref 0.1–1.2)
ALBUMIN SERPL-MCNC: 4.4 G/DL (ref 3.5–5.2)
ALBUMIN/GLOBULIN RATIO: 1.4 (ref 1–2.5)
ALP BLD-CCNC: 85 U/L (ref 35–104)
ALT SERPL-CCNC: 26 U/L (ref 5–33)
ANION GAP SERPL CALCULATED.3IONS-SCNC: 10 MMOL/L (ref 9–17)
AST SERPL-CCNC: 23 U/L
BASOPHILS # BLD: 1 % (ref 0–2)
BASOPHILS ABSOLUTE: 0.06 K/UL (ref 0–0.2)
BILIRUB SERPL-MCNC: 1.65 MG/DL (ref 0.3–1.2)
BUN BLDV-MCNC: 16 MG/DL (ref 8–23)
BUN/CREAT BLD: 24 (ref 9–20)
CALCIUM SERPL-MCNC: 9.3 MG/DL (ref 8.6–10.4)
CHLORIDE BLD-SCNC: 105 MMOL/L (ref 98–107)
CHOLESTEROL/HDL RATIO: 3.5
CHOLESTEROL: 178 MG/DL
CO2: 26 MMOL/L (ref 20–31)
CREAT SERPL-MCNC: 0.67 MG/DL (ref 0.5–0.9)
DIFFERENTIAL TYPE: ABNORMAL
EOSINOPHILS RELATIVE PERCENT: 8 % (ref 1–4)
ESTIMATED AVERAGE GLUCOSE: 105 MG/DL
GFR AFRICAN AMERICAN: >60 ML/MIN
GFR NON-AFRICAN AMERICAN: >60 ML/MIN
GFR SERPL CREATININE-BSD FRML MDRD: ABNORMAL ML/MIN/{1.73_M2}
GFR SERPL CREATININE-BSD FRML MDRD: ABNORMAL ML/MIN/{1.73_M2}
GLUCOSE BLD-MCNC: 108 MG/DL (ref 70–99)
HBA1C MFR BLD: 5.3 % (ref 4–6)
HCT VFR BLD CALC: 41.6 % (ref 36.3–47.1)
HDLC SERPL-MCNC: 51 MG/DL
HEMOGLOBIN: 13.2 G/DL (ref 11.9–15.1)
IMMATURE GRANULOCYTES: 0 %
LDL CHOLESTEROL: 102 MG/DL (ref 0–130)
LYMPHOCYTES # BLD: 35 % (ref 24–43)
MCH RBC QN AUTO: 28 PG (ref 25.2–33.5)
MCHC RBC AUTO-ENTMCNC: 31.7 G/DL (ref 25.2–33.5)
MCV RBC AUTO: 88.3 FL (ref 82.6–102.9)
MONOCYTES # BLD: 8 % (ref 3–12)
NRBC AUTOMATED: 0 PER 100 WBC
PDW BLD-RTO: 12.6 % (ref 11.8–14.4)
PLATELET # BLD: 308 K/UL (ref 138–453)
PLATELET ESTIMATE: ABNORMAL
PMV BLD AUTO: 8.7 FL (ref 8.1–13.5)
POTASSIUM SERPL-SCNC: 4.1 MMOL/L (ref 3.7–5.3)
RBC # BLD: 4.71 M/UL (ref 3.95–5.11)
RBC # BLD: ABNORMAL 10*6/UL
SEG NEUTROPHILS: 48 % (ref 36–65)
SEGMENTED NEUTROPHILS ABSOLUTE COUNT: 3.4 K/UL (ref 1.5–8.1)
SODIUM BLD-SCNC: 141 MMOL/L (ref 135–144)
TOTAL PROTEIN: 7.6 G/DL (ref 6.4–8.3)
TRIGL SERPL-MCNC: 125 MG/DL
VLDLC SERPL CALC-MCNC: NORMAL MG/DL (ref 1–30)
WBC # BLD: 7 K/UL (ref 3.5–11.3)
WBC # BLD: ABNORMAL 10*3/UL

## 2021-04-30 PROCEDURE — 36415 COLL VENOUS BLD VENIPUNCTURE: CPT

## 2021-04-30 PROCEDURE — 85025 COMPLETE CBC W/AUTO DIFF WBC: CPT

## 2021-04-30 PROCEDURE — 80061 LIPID PANEL: CPT

## 2021-04-30 PROCEDURE — 80053 COMPREHEN METABOLIC PANEL: CPT

## 2021-04-30 PROCEDURE — 83036 HEMOGLOBIN GLYCOSYLATED A1C: CPT

## 2021-05-05 ENCOUNTER — OFFICE VISIT (OUTPATIENT)
Dept: FAMILY MEDICINE CLINIC | Age: 66
End: 2021-05-05
Payer: COMMERCIAL

## 2021-05-05 VITALS
RESPIRATION RATE: 18 BRPM | BODY MASS INDEX: 38.07 KG/M2 | HEIGHT: 64 IN | TEMPERATURE: 96.8 F | DIASTOLIC BLOOD PRESSURE: 80 MMHG | SYSTOLIC BLOOD PRESSURE: 110 MMHG | WEIGHT: 223 LBS | HEART RATE: 68 BPM | OXYGEN SATURATION: 97 %

## 2021-05-05 DIAGNOSIS — Z00.00 ROUTINE GENERAL MEDICAL EXAMINATION AT A HEALTH CARE FACILITY: Primary | ICD-10-CM

## 2021-05-05 DIAGNOSIS — R73.01 IMPAIRED FASTING GLUCOSE: ICD-10-CM

## 2021-05-05 DIAGNOSIS — E78.2 MIXED HYPERLIPIDEMIA: ICD-10-CM

## 2021-05-05 DIAGNOSIS — L98.9 SKIN LESION OF CHEST WALL: ICD-10-CM

## 2021-05-05 PROCEDURE — 99396 PREV VISIT EST AGE 40-64: CPT | Performed by: FAMILY MEDICINE

## 2021-05-05 ASSESSMENT — ENCOUNTER SYMPTOMS
RHINORRHEA: 0
VOMITING: 0
DIARRHEA: 0
CONSTIPATION: 0
COUGH: 0
NAUSEA: 0
SORE THROAT: 0
SINUS PRESSURE: 0
WHEEZING: 0
TROUBLE SWALLOWING: 0
SHORTNESS OF BREATH: 0
ABDOMINAL PAIN: 0
EYE REDNESS: 0
EYE DISCHARGE: 0

## 2021-05-05 ASSESSMENT — PATIENT HEALTH QUESTIONNAIRE - PHQ9
SUM OF ALL RESPONSES TO PHQ QUESTIONS 1-9: 0
1. LITTLE INTEREST OR PLEASURE IN DOING THINGS: 0
SUM OF ALL RESPONSES TO PHQ QUESTIONS 1-9: 0
2. FEELING DOWN, DEPRESSED OR HOPELESS: 0

## 2021-05-05 NOTE — PROGRESS NOTES
2021     Elmer Priest (:  1955) is a 60 y.o. female, here for evaluation of the following medical concerns:    HPI  Patient comes in today for a routine general physical exam and for follow up of chronic health issues Patient does have a brown pigmented lesion to her chest that is irregular in appearance. Had a similar 1 removed adjacent to that a few years ago and I did review the pathology report. 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 Do think that the lesion that she has currently should be removed due to the fact that it has gotten larger and more noticeable. Otherwise patient seems to be doing relatively well. Does have a known history of impaired fasting glucose her blood sugar level is stable controlled with her current dietary intake. Has a known history of hyperlipidemia and her cholesterol levels are relatively stable and controlled with her current dietary intake. Did encourage continued low-carb/low sugar/low-fat diet and increase exercise to keep this optimally controlled. Patient otherwise has no other significant concerning abnormalities on her labs. Patient has been working on weight loss and so encouraged her to continue with weight loss efforts. Patient otherwise has no other acute medical concerns at this time. .  Patient's recent lab reports are as follows:    Results for orders placed or performed during the hospital encounter of 21   Hemoglobin A1C   Result Value Ref Range    Hemoglobin A1C 5.3 4.0 - 6.0 %    Estimated Avg Glucose 105 mg/dL   CBC Auto Differential   Result Value Ref Range    WBC 7.0 3.5 - 11.3 k/uL    RBC 4.71 3.95 - 5.11 m/uL    Hemoglobin 13.2 11.9 - 15.1 g/dL    Hematocrit 41.6 36.3 - 47.1 %    MCV 88.3 82.6 - 102.9 fL    MCH 28.0 25.2 - 33.5 pg    MCHC 31.7 25.2 - 33.5 g/dL    RDW 12.6 11.8 - 14.4 %    Platelets 209 620 - 639 k/uL    MPV 8.7 8.1 - 13.5 fL    NRBC Automated 0.0 0.0 per 100 WBC    Differential Type NOT REPORTED     Seg Neutrophils 48 36 - 65 %    Lymphocytes 35 24 - 43 %    Monocytes 8 3 - 12 %    Eosinophils % 8 (H) 1 - 4 %    Basophils 1 0 - 2 %    Immature Granulocytes 0 0 %    Segs Absolute 3.40 1.50 - 8.10 k/uL    Absolute Lymph # 2.47 1.10 - 3.70 k/uL    Absolute Mono # 0.54 0.10 - 1.20 k/uL    Absolute Eos # 0.54 (H) 0.00 - 0.44 k/uL    Basophils Absolute 0.06 0.00 - 0.20 k/uL    Absolute Immature Granulocyte <0.03 0.00 - 0.30 k/uL    WBC Morphology NOT REPORTED     RBC Morphology NOT REPORTED     Platelet Estimate NOT REPORTED    Lipid Panel   Result Value Ref Range    Cholesterol 178 <200 mg/dL    HDL 51 >40 mg/dL    LDL Cholesterol 102 0 - 130 mg/dL    Chol/HDL Ratio 3.5 <5    Triglycerides 125 <150 mg/dL    VLDL NOT REPORTED 1 - 30 mg/dL   Comprehensive Metabolic Panel   Result Value Ref Range    Glucose 108 (H) 70 - 99 mg/dL    BUN 16 8 - 23 mg/dL    CREATININE 0.67 0.50 - 0.90 mg/dL    Bun/Cre Ratio 24 (H) 9 - 20    Calcium 9.3 8.6 - 10.4 mg/dL    Sodium 141 135 - 144 mmol/L    Potassium 4.1 3.7 - 5.3 mmol/L    Chloride 105 98 - 107 mmol/L    CO2 26 20 - 31 mmol/L    Anion Gap 10 9 - 17 mmol/L    Alkaline Phosphatase 85 35 - 104 U/L    ALT 26 5 - 33 U/L    AST 23 <32 U/L    Total Bilirubin 1.65 (H) 0.3 - 1.2 mg/dL    Total Protein 7.6 6.4 - 8.3 g/dL    Albumin 4.4 3.5 - 5.2 g/dL    Albumin/Globulin Ratio 1.4 1.0 - 2.5    GFR Non-African American >60 >60 mL/min    GFR African American >60 >60 mL/min    GFR Comment          GFR Staging NOT REPORTED       Other review of systems are as noted below. Preventative measures are reviewed today. See health maintenance section for complete preventative plan of care. Did review patient's med list, allergies, social history, fam history, pmhx and pshx today as noted in the record. Review of Systems   Constitutional: Negative for chills, fatigue and fever.    HENT: Negative for congestion, ear pain, postnasal drip, rhinorrhea, sinus pressure, sore throat and trouble swallowing. Eyes: Negative for discharge and redness. Respiratory: Negative for cough, shortness of breath and wheezing. Cardiovascular: Negative for chest pain. Gastrointestinal: Negative for abdominal pain, constipation, diarrhea, nausea and vomiting. Genitourinary: Negative for dysuria, flank pain, frequency and urgency. Musculoskeletal: Negative for arthralgias, myalgias and neck pain. Skin: Negative for rash and wound. Allergic/Immunologic: Negative for environmental allergies. Neurological: Negative for dizziness, weakness, light-headedness and headaches. Hematological: Negative for adenopathy. Psychiatric/Behavioral: Negative. Prior to Visit Medications    Medication Sig Taking? Authorizing Provider   omeprazole (PRILOSEC) 20 MG delayed release capsule TAKE 1 CAPSULE BY MOUTH ONE TIME A DAY  Sharron Coronado MD   Ascorbic Acid (VITAMIN C PO) Take 1 tablet by mouth daily  Historical Provider, MD   valACYclovir (VALTREX) 1 g tablet TAKE TWO TABLETS BY MOUTH TWO TIMES A DAY FOR 1 DAY. (TOTAL OF 4 TABLETS PER COURSE OF TREATMENT)  Aubree Chavez DO   loratadine (CLARITIN) 10 MG tablet Take 10 mg by mouth daily  Historical Provider, MD   ibuprofen (ADVIL;MOTRIN) 600 MG tablet Take 600 mg by mouth every 8 hours as needed for Pain. Historical Provider, MD   Multiple Vitamins-Minerals (MULTIVITAMIN PO) Take 1 tablet by mouth daily. Historical Provider, MD        Social History     Tobacco Use    Smoking status: Never Smoker    Smokeless tobacco: Never Used   Substance Use Topics    Alcohol use: No        There were no vitals filed for this visit. Estimated body mass index is 40.75 kg/m² as calculated from the following:    Height as of 1/2/21: 5' 4\" (1.626 m). Weight as of 1/2/21: 237 lb 6.4 oz (107.7 kg). Physical Exam  Vitals signs and nursing note reviewed.    Constitutional:       General: She is not in acute distress. Appearance: Normal appearance. She is well-developed. She is not diaphoretic. HENT:      Head: Normocephalic and atraumatic. Right Ear: Tympanic membrane, ear canal and external ear normal.      Left Ear: Tympanic membrane, ear canal and external ear normal.      Nose: Nose normal.      Mouth/Throat:      Mouth: Mucous membranes are moist.      Pharynx: Oropharynx is clear. No oropharyngeal exudate. Eyes:      General:         Right eye: No discharge. Left eye: No discharge. Conjunctiva/sclera: Conjunctivae normal.      Pupils: Pupils are equal, round, and reactive to light. Neck:      Musculoskeletal: Normal range of motion and neck supple. Thyroid: No thyromegaly. Cardiovascular:      Rate and Rhythm: Normal rate and regular rhythm. Heart sounds: Normal heart sounds. Pulmonary:      Effort: Pulmonary effort is normal.      Breath sounds: Normal breath sounds. No wheezing or rales. Abdominal:      General: Bowel sounds are normal. There is no distension. Palpations: Abdomen is soft. Tenderness: There is no abdominal tenderness. Lymphadenopathy:      Cervical: No cervical adenopathy. Skin:     General: Skin is warm and dry. Findings: No rash. Neurological:      Mental Status: She is alert and oriented to person, place, and time. Psychiatric:         Behavior: Behavior normal.         Thought Content: Thought content normal.         Judgment: Judgment normal.           ASSESSMENT/PLAN:    Encounter Diagnoses   Name Primary?  Routine general medical examination at a health care facility Yes    Impaired fasting glucose     Mixed hyperlipidemia     Skin lesion of chest wall      No orders of the defined types were placed in this encounter.     Orders Placed This Encounter   Procedures    Lipid Panel     Standing Status:   Future     Standing Expiration Date:   11/1/2022     Order Specific Question:   Is Patient Fasting?/# of Hours Answer:   yes, 12    Comprehensive Metabolic Panel     Standing Status:   Future     Standing Expiration Date:   11/1/2022    CBC Auto Differential     Standing Status:   Future     Standing Expiration Date:   11/1/2022    Hemoglobin A1C     Standing Status:   Future     Standing Expiration Date:   11/1/2022   Jaiden Hernandez MD, General Surgery, [place]     Referral Priority:   Routine     Referral Type:   Eval and Treat     Referral Reason:   Specialty Services Required     Referred to Provider:   Brigido Hunter MD     Requested Specialty:   General Surgery     Number of Visits Requested:   1     Will refer patient to Dr. Max Lutz for excision of the lesion on her chest.  She does have another lesion on her right cheek that we had previously treated with cryotherapy but it has returned. She will have him look at this as well and maybe have it completely excised. Patient is to continue to follow a low-carb/low sugar/low-fat diet and increase exercise for optimal blood sugar and cholesterol control and to help with weight management. Patient is to return to my office in 6 months duration or sooner if any further problems or symptoms arise. (Please note that portions of this note were completed with a voice recognition program. Efforts were made to edit the dictations but occasionally words are mis-transcribed.)      No follow-ups on file. An electronic signature was used to authenticate this note.     --Preet Love, DO on 5/5/2021 at 7:56 AM

## 2021-05-05 NOTE — PATIENT INSTRUCTIONS
Hospital Outpatient Visit on 04/30/2021   Component Date Value Ref Range Status    Hemoglobin A1C 04/30/2021 5.3  4.0 - 6.0 % Final    Estimated Avg Glucose 04/30/2021 105  mg/dL Final    Comment: The ADA and AACC recommend providing the estimated average glucose result to permit better   patient understanding of their HBA1c result.       WBC 04/30/2021 7.0  3.5 - 11.3 k/uL Final    RBC 04/30/2021 4.71  3.95 - 5.11 m/uL Final    Hemoglobin 04/30/2021 13.2  11.9 - 15.1 g/dL Final    Hematocrit 04/30/2021 41.6  36.3 - 47.1 % Final    MCV 04/30/2021 88.3  82.6 - 102.9 fL Final    MCH 04/30/2021 28.0  25.2 - 33.5 pg Final    MCHC 04/30/2021 31.7  25.2 - 33.5 g/dL Final    RDW 04/30/2021 12.6  11.8 - 14.4 % Final    Platelets 91/11/8338 308  138 - 453 k/uL Final    MPV 04/30/2021 8.7  8.1 - 13.5 fL Final    NRBC Automated 04/30/2021 0.0  0.0 per 100 WBC Final    Differential Type 04/30/2021 NOT REPORTED   Final    Seg Neutrophils 04/30/2021 48  36 - 65 % Final    Lymphocytes 04/30/2021 35  24 - 43 % Final    Monocytes 04/30/2021 8  3 - 12 % Final    Eosinophils % 04/30/2021 8* 1 - 4 % Final    Basophils 04/30/2021 1  0 - 2 % Final    Immature Granulocytes 04/30/2021 0  0 % Final    Segs Absolute 04/30/2021 3.40  1.50 - 8.10 k/uL Final    Absolute Lymph # 04/30/2021 2.47  1.10 - 3.70 k/uL Final    Absolute Mono # 04/30/2021 0.54  0.10 - 1.20 k/uL Final    Absolute Eos # 04/30/2021 0.54* 0.00 - 0.44 k/uL Final    Basophils Absolute 04/30/2021 0.06  0.00 - 0.20 k/uL Final    Absolute Immature Granulocyte 04/30/2021 <0.03  0.00 - 0.30 k/uL Final    WBC Morphology 04/30/2021 NOT REPORTED   Final    RBC Morphology 04/30/2021 NOT REPORTED   Final    Platelet Estimate 65/11/5859 NOT REPORTED   Final    Cholesterol 04/30/2021 178  <200 mg/dL Final    Comment:    Cholesterol Guidelines:      <200  Desirable   200-240  Borderline      >240  Undesirable         HDL 04/30/2021 51  >40 mg/dL Final Comment:    HDL Guidelines:    <40     Undesirable   40-59    Borderline    >59     Desirable         LDL Cholesterol 04/30/2021 102  0 - 130 mg/dL Final    Comment:    LDL Guidelines:     <100    Desirable   100-129   Near to/above Desirable   130-159   Borderline      >159   Undesirable     Direct (measured) LDL and calculated LDL are not interchangeable tests.  Chol/HDL Ratio 04/30/2021 3.5  <5 Final            Triglycerides 04/30/2021 125  <150 mg/dL Final    Comment:    Triglyceride Guidelines:     <150   Desirable   150-199  Borderline   200-499  High     >499   Very high   Based on AHA Guidelines for fasting triglyceride, October 2012.  VLDL 04/30/2021 NOT REPORTED  1 - 30 mg/dL Final    Glucose 04/30/2021 108* 70 - 99 mg/dL Final    BUN 04/30/2021 16  8 - 23 mg/dL Final    CREATININE 04/30/2021 0.67  0.50 - 0.90 mg/dL Final    Bun/Cre Ratio 04/30/2021 24* 9 - 20 Final    Calcium 04/30/2021 9.3  8.6 - 10.4 mg/dL Final    Sodium 04/30/2021 141  135 - 144 mmol/L Final    Potassium 04/30/2021 4.1  3.7 - 5.3 mmol/L Final    Chloride 04/30/2021 105  98 - 107 mmol/L Final    CO2 04/30/2021 26  20 - 31 mmol/L Final    Anion Gap 04/30/2021 10  9 - 17 mmol/L Final    Alkaline Phosphatase 04/30/2021 85  35 - 104 U/L Final    ALT 04/30/2021 26  5 - 33 U/L Final    AST 04/30/2021 23  <32 U/L Final    Total Bilirubin 04/30/2021 1.65* 0.3 - 1.2 mg/dL Final    Total Protein 04/30/2021 7.6  6.4 - 8.3 g/dL Final    Albumin 04/30/2021 4.4  3.5 - 5.2 g/dL Final    Albumin/Globulin Ratio 04/30/2021 1.4  1.0 - 2.5 Final    GFR Non- 04/30/2021 >60  >60 mL/min Final    GFR  04/30/2021 >60  >60 mL/min Final    GFR Comment 04/30/2021        Final    Comment: Average GFR for 61-76 years old:   80 mL/min/1.73sq m  Chronic Kidney Disease:   <60 mL/min/1.73sq m  Kidney failure:   <15 mL/min/1.73sq m              eGFR calculated using average adult body mass.  Additional eGFR calculator available at:        [email or web address]            GFR Staging 04/30/2021 NOT REPORTED   Final

## 2021-06-13 ENCOUNTER — PATIENT MESSAGE (OUTPATIENT)
Dept: FAMILY MEDICINE CLINIC | Age: 66
End: 2021-06-13

## 2021-06-13 DIAGNOSIS — L98.9 SKIN LESION: Primary | ICD-10-CM

## 2021-06-14 NOTE — TELEPHONE ENCOUNTER
From: Maryan Mccarthy  To: Vignesh Argueta DO  Sent: 6/13/2021 9:58 AM EDT  Subject: Non-Urgent Medical Question    Gema Morales. We were camping and in the sun quite a bit. I did put sunscreen on. It is a raised pink spot I noticed and it did itch a bit. Now it does not itch but I wondered if I should come in and have it looked at or make an appointment with a dermatologist. Let me know your thoughts.

## 2021-08-02 ENCOUNTER — PATIENT MESSAGE (OUTPATIENT)
Dept: FAMILY MEDICINE CLINIC | Age: 66
End: 2021-08-02

## 2021-08-03 NOTE — TELEPHONE ENCOUNTER
From: Mary Anne Camejo  To: Eulogio Cobos DO  Sent: 8/2/2021 9:55 PM EDT  Subject: Non-Urgent Medical Question    I will not need to come in Wed. I thought the joseph looked weird but it was a bruise that is fading away.

## 2021-08-17 ENCOUNTER — PATIENT MESSAGE (OUTPATIENT)
Dept: FAMILY MEDICINE CLINIC | Age: 66
End: 2021-08-17

## 2021-08-17 NOTE — TELEPHONE ENCOUNTER
From: Maik Robles  To: Laura Spann DO  Sent: 8/17/2021 12:29 PM EDT  Subject: Non-Urgent Medical Question    I received a letter stating I may need another mammogram. I was unsure if I need to set that up or wait till next year to do another one   Can you look over my report and let me know if I need to schedule that.  Thank you

## 2021-08-23 ENCOUNTER — E-VISIT (OUTPATIENT)
Dept: PRIMARY CARE CLINIC | Age: 66
End: 2021-08-23
Payer: COMMERCIAL

## 2021-08-23 DIAGNOSIS — H10.32 ACUTE BACTERIAL CONJUNCTIVITIS OF LEFT EYE: Primary | ICD-10-CM

## 2021-08-23 PROCEDURE — 99422 OL DIG E/M SVC 11-20 MIN: CPT | Performed by: FAMILY MEDICINE

## 2021-08-23 RX ORDER — CIPROFLOXACIN HYDROCHLORIDE 3.5 MG/ML
2 SOLUTION/ DROPS TOPICAL 4 TIMES DAILY
Qty: 1 BOTTLE | Refills: 0 | Status: SHIPPED | OUTPATIENT
Start: 2021-08-23 | End: 2021-09-02

## 2021-08-23 NOTE — PROGRESS NOTES
Patient has evidence of left conjunctivitis. Will treat with antibiotic drops as ordered and have her follow up if persistent issues. 11-20 minutes were spent on the digital evaluation and management of this patient.

## 2021-08-30 ENCOUNTER — OFFICE VISIT (OUTPATIENT)
Dept: FAMILY MEDICINE CLINIC | Age: 66
End: 2021-08-30
Payer: COMMERCIAL

## 2021-08-30 VITALS
SYSTOLIC BLOOD PRESSURE: 130 MMHG | WEIGHT: 213.9 LBS | HEIGHT: 65 IN | HEART RATE: 87 BPM | DIASTOLIC BLOOD PRESSURE: 86 MMHG | TEMPERATURE: 95.3 F | RESPIRATION RATE: 12 BRPM | OXYGEN SATURATION: 97 % | BODY MASS INDEX: 35.64 KG/M2

## 2021-08-30 DIAGNOSIS — N90.4 LICHEN SCLEROSUS OF VULVA: ICD-10-CM

## 2021-08-30 DIAGNOSIS — N90.89 VULVAR LESION: Primary | ICD-10-CM

## 2021-08-30 PROCEDURE — 99213 OFFICE O/P EST LOW 20 MIN: CPT | Performed by: FAMILY MEDICINE

## 2021-08-30 RX ORDER — CLOBETASOL PROPIONATE 0.5 MG/G
CREAM TOPICAL
Qty: 60 G | Refills: 1 | Status: SHIPPED | OUTPATIENT
Start: 2021-08-30

## 2021-08-30 SDOH — ECONOMIC STABILITY: FOOD INSECURITY: WITHIN THE PAST 12 MONTHS, THE FOOD YOU BOUGHT JUST DIDN'T LAST AND YOU DIDN'T HAVE MONEY TO GET MORE.: NEVER TRUE

## 2021-08-30 SDOH — ECONOMIC STABILITY: FOOD INSECURITY: WITHIN THE PAST 12 MONTHS, YOU WORRIED THAT YOUR FOOD WOULD RUN OUT BEFORE YOU GOT MONEY TO BUY MORE.: NEVER TRUE

## 2021-08-30 ASSESSMENT — SOCIAL DETERMINANTS OF HEALTH (SDOH): HOW HARD IS IT FOR YOU TO PAY FOR THE VERY BASICS LIKE FOOD, HOUSING, MEDICAL CARE, AND HEATING?: NOT HARD AT ALL

## 2021-08-30 NOTE — PROGRESS NOTES
2021     Godwin Brooks (:  1955) is a 60 y.o. female, here for evaluation of the following medical concerns:    Other  This is a new problem. The current episode started 1 to 4 weeks ago (has had ongoing issues with itching to the vulvar/labial area for quite some time, but has a small area to the left posterior labial region that appeared to be purple in color and abnormal when she looked at the area). The problem occurs constantly. The problem has been unchanged. She has tried nothing for the symptoms. Did review patient's med list, allergies, social history,pmhx and pshx today as noted in the record. Review of Systems   Genitourinary: Positive for genital sores. Irritation to the left labial region       Prior to Visit Medications    Medication Sig Taking? Authorizing Provider   clobetasol (TEMOVATE) 0.05 % cream Apply topically 2 times daily to affected area. Yes Fabian Villanueva DO   ciprofloxacin (CILOXAN) 0.3 % ophthalmic solution Place 2 drops into the left eye 4 times daily for 10 days Yes Fabian Villanueva DO   omeprazole (PRILOSEC) 20 MG delayed release capsule TAKE 1 CAPSULE BY MOUTH ONE TIME A DAY Yes Patricia Guerrero MD   ibuprofen (ADVIL;MOTRIN) 600 MG tablet Take 600 mg by mouth every 8 hours as needed for Pain. Yes Historical Provider, MD   Multiple Vitamins-Minerals (MULTIVITAMIN PO) Take 1 tablet by mouth daily. Yes Historical Provider, MD   valACYclovir (VALTREX) 1 g tablet TAKE TWO TABLETS BY MOUTH TWO TIMES A DAY FOR 1 DAY.  (TOTAL OF 4 TABLETS PER COURSE OF TREATMENT)  Patient not taking: Reported on 2021  Fabian Villanueva DO   loratadine (CLARITIN) 10 MG tablet Take 10 mg by mouth daily  Patient not taking: Reported on 2021  Historical Provider, MD        Social History     Tobacco Use    Smoking status: Never Smoker    Smokeless tobacco: Never Used   Substance Use Topics    Alcohol use: No        Vitals:    21 1442   BP: 130/86   Site: Priority:   Routine     Referral Type:   Eval and Treat     Referral Reason:   Patient Preference     Referred to Provider:   Andrade Zhang DO     Requested Specialty:   Obstetrics & Gynecology     Number of Visits Requested:   1     Will refer to OB/GYN for further assessment of the raised skin lesion to the left posterior labial skin. Return  if no improvement in symptoms or if any further symptoms arise. No follow-ups on file. An electronic signature was used to authenticate this note.     --Laci Owens DO on 8/30/2021 at 3:02 PM

## 2021-09-13 ENCOUNTER — TELEPHONE (OUTPATIENT)
Dept: SURGERY | Age: 66
End: 2021-09-13

## 2021-09-22 ENCOUNTER — TELEPHONE (OUTPATIENT)
Dept: SURGERY | Age: 66
End: 2021-09-22

## 2021-09-22 NOTE — TELEPHONE ENCOUNTER
Received paperwork in the mail for patient to schedule 5 year repeat colonoscopy. She has a family history of colon cancer and had a polyp on last scope. Instructions mailed to patient for procedure scheduled at [facility] on 11/30/21.

## 2021-09-22 NOTE — TELEPHONE ENCOUNTER
Patient:Agnes Jerome                 :1955  (yes) patient has seen Dr. Vikki Blanca prior to procedure  PCP: Melody Arias    History of colon polyp  Family history of colon cancer      HPI:        Colonoscopy  Abd pain: no  Anemia: no  Bloating:no  Diarrhea: no  Constipation: no  Melena: no  Hematochezia:no  Rectal Bleeding:no  Rectal/Anal Pain:no  Pruritus: no  Family history colon Cancer: yes, father age 80  Previous colon cancer: no  Previous Colon Polyp: yes  Change in bowels: no  Decrease caliber of stool: no  Change in color of stool: no    Previous work up date: 10/17/2016-colonoscopy =hyperplastic polyp

## 2021-11-02 ENCOUNTER — PATIENT MESSAGE (OUTPATIENT)
Dept: FAMILY MEDICINE CLINIC | Age: 66
End: 2021-11-02

## 2021-11-02 NOTE — TELEPHONE ENCOUNTER
From: Clay Thomas  To: Marguerite Perez DO  Sent: 11/2/2021 8:03 AM EDT  Subject: Non-Urgent Medical Question    I had the Ellie Membreno and Ellie Membreno shot should I do that in a booster or something else? I have scheduled the J&J in [facility] but Hanna Rogers was saying something about doing something different. Let me know please.  Thanks 1

## 2021-11-30 ENCOUNTER — HOSPITAL ENCOUNTER (OUTPATIENT)
Age: 66
Setting detail: OUTPATIENT SURGERY
Discharge: HOME | End: 2021-11-30
Attending: SURGERY | Admitting: SURGERY
Payer: COMMERCIAL

## 2021-11-30 ENCOUNTER — ANESTHESIA EVENT (OUTPATIENT)
Dept: OPERATING ROOM | Age: 66
End: 2021-11-30
Payer: COMMERCIAL

## 2021-11-30 ENCOUNTER — ANESTHESIA (OUTPATIENT)
Dept: OPERATING ROOM | Age: 66
End: 2021-11-30
Payer: COMMERCIAL

## 2021-11-30 VITALS
DIASTOLIC BLOOD PRESSURE: 63 MMHG | RESPIRATION RATE: 16 BRPM | TEMPERATURE: 98.4 F | WEIGHT: 215 LBS | BODY MASS INDEX: 36.7 KG/M2 | HEIGHT: 64 IN | HEART RATE: 68 BPM | SYSTOLIC BLOOD PRESSURE: 118 MMHG | OXYGEN SATURATION: 97 %

## 2021-11-30 VITALS — SYSTOLIC BLOOD PRESSURE: 121 MMHG | OXYGEN SATURATION: 97 % | DIASTOLIC BLOOD PRESSURE: 76 MMHG

## 2021-11-30 PROCEDURE — 2580000003 HC RX 258: Performed by: SURGERY

## 2021-11-30 PROCEDURE — 6360000002 HC RX W HCPCS: Performed by: NURSE ANESTHETIST, CERTIFIED REGISTERED

## 2021-11-30 PROCEDURE — 3700000000 HC ANESTHESIA ATTENDED CARE: Performed by: SURGERY

## 2021-11-30 PROCEDURE — 88305 TISSUE EXAM BY PATHOLOGIST: CPT

## 2021-11-30 PROCEDURE — 7100000010 HC PHASE II RECOVERY - FIRST 15 MIN: Performed by: SURGERY

## 2021-11-30 PROCEDURE — 3609010300 HC COLONOSCOPY W/BIOPSY SINGLE/MULTIPLE: Performed by: SURGERY

## 2021-11-30 PROCEDURE — 3700000001 HC ADD 15 MINUTES (ANESTHESIA): Performed by: SURGERY

## 2021-11-30 PROCEDURE — 7100000011 HC PHASE II RECOVERY - ADDTL 15 MIN: Performed by: SURGERY

## 2021-11-30 PROCEDURE — 45380 COLONOSCOPY AND BIOPSY: CPT | Performed by: SURGERY

## 2021-11-30 PROCEDURE — 2709999900 HC NON-CHARGEABLE SUPPLY: Performed by: SURGERY

## 2021-11-30 PROCEDURE — 2580000003 HC RX 258: Performed by: NURSE ANESTHETIST, CERTIFIED REGISTERED

## 2021-11-30 PROCEDURE — 2500000003 HC RX 250 WO HCPCS: Performed by: NURSE ANESTHETIST, CERTIFIED REGISTERED

## 2021-11-30 RX ORDER — PROPOFOL 10 MG/ML
INJECTION, EMULSION INTRAVENOUS PRN
Status: DISCONTINUED | OUTPATIENT
Start: 2021-11-30 | End: 2021-11-30 | Stop reason: SDUPTHER

## 2021-11-30 RX ORDER — LIDOCAINE HYDROCHLORIDE 40 MG/ML
INJECTION, SOLUTION RETROBULBAR; TOPICAL PRN
Status: DISCONTINUED | OUTPATIENT
Start: 2021-11-30 | End: 2021-11-30 | Stop reason: SDUPTHER

## 2021-11-30 RX ORDER — SODIUM CHLORIDE, SODIUM LACTATE, POTASSIUM CHLORIDE, CALCIUM CHLORIDE 600; 310; 30; 20 MG/100ML; MG/100ML; MG/100ML; MG/100ML
INJECTION, SOLUTION INTRAVENOUS CONTINUOUS
Status: DISCONTINUED | OUTPATIENT
Start: 2021-11-30 | End: 2021-11-30 | Stop reason: HOSPADM

## 2021-11-30 RX ORDER — SODIUM CHLORIDE, SODIUM LACTATE, POTASSIUM CHLORIDE, CALCIUM CHLORIDE 600; 310; 30; 20 MG/100ML; MG/100ML; MG/100ML; MG/100ML
INJECTION, SOLUTION INTRAVENOUS CONTINUOUS PRN
Status: DISCONTINUED | OUTPATIENT
Start: 2021-11-30 | End: 2021-11-30 | Stop reason: SDUPTHER

## 2021-11-30 RX ADMIN — SODIUM CHLORIDE, POTASSIUM CHLORIDE, SODIUM LACTATE AND CALCIUM CHLORIDE: 600; 310; 30; 20 INJECTION, SOLUTION INTRAVENOUS at 07:28

## 2021-11-30 RX ADMIN — LIDOCAINE HYDROCHLORIDE 120 MG: 40 INJECTION, SOLUTION RETROBULBAR; TOPICAL at 07:36

## 2021-11-30 RX ADMIN — PROPOFOL 300 MG: 10 INJECTION, EMULSION INTRAVENOUS at 07:36

## 2021-11-30 RX ADMIN — SODIUM CHLORIDE, POTASSIUM CHLORIDE, SODIUM LACTATE AND CALCIUM CHLORIDE: 600; 310; 30; 20 INJECTION, SOLUTION INTRAVENOUS at 07:36

## 2021-11-30 ASSESSMENT — PAIN SCALES - GENERAL
PAINLEVEL_OUTOF10: 0
PAINLEVEL_OUTOF10: 0

## 2021-11-30 ASSESSMENT — PAIN - FUNCTIONAL ASSESSMENT: PAIN_FUNCTIONAL_ASSESSMENT: 0-10

## 2021-11-30 NOTE — ANESTHESIA PRE PROCEDURE
Department of Anesthesiology  Preprocedure Note       Name:  Christopher Zaragoza   Age:  60 y.o.  :  1955                                          MRN:  [id number]         Date:  2021      Surgeon: Cory Clifford):  Rae Cowan MD    Procedure: Procedure(s):  Colonoscopy    Medications prior to admission:   Prior to Admission medications    Medication Sig Start Date End Date Taking? Authorizing Provider   omeprazole (PRILOSEC) 20 MG delayed release capsule TAKE 1 CAPSULE BY MOUTH ONE TIME A DAY 21  Yes Rae Cowan MD   loratadine (CLARITIN) 10 MG tablet Take 10 mg by mouth daily    Yes Historical Provider, MD   ibuprofen (ADVIL;MOTRIN) 600 MG tablet Take 600 mg by mouth every 8 hours as needed for Pain. Yes Historical Provider, MD   Multiple Vitamins-Minerals (MULTIVITAMIN PO) Take 1 tablet by mouth daily. Yes Historical Provider, MD   clobetasol (TEMOVATE) 0.05 % cream Apply topically 2 times daily to affected area. 21   Lisandra Mathias DO   valACYclovir (VALTREX) 1 g tablet TAKE TWO TABLETS BY MOUTH TWO TIMES A DAY FOR 1 DAY. (TOTAL OF 4 TABLETS PER COURSE OF TREATMENT)  Patient not taking: Reported on 2021   Lisandra Mathias DO       Current medications:    No current facility-administered medications for this encounter. Allergies: Allergies   Allergen Reactions    Adhesive Tape     Bactrim [Sulfamethoxazole-Trimethoprim] Rash       Problem List:    Patient Active Problem List   Diagnosis Code    Impaired fasting glucose R73.01    Mixed hyperlipidemia E78.2       Past Medical History:        Diagnosis Date    Allergic rhinitis     Gallstones     History of chicken pox     HSIL (high grade squamous intraepithelial lesion) on Pap smear of cervix      with HPV noted on path report.   Did have Laser Cone procedure       Past Surgical History:        Procedure Laterality Date    BLADDER SUSPENSION      Dr Halle Wolf BIOPSY  1999    Laser cone due to HSIL     SECTION      CHOLECYSTECTOMY      COLONOSCOPY  10/17/2016    hyperplastic  polyp, Dr. Nico Rubin, VAGINAL  2014    Dr Kosta Paige; VH with BSO, external [facility] culdoplasty with uterosacral ligament suspension, anterior colprrhapy, cystourethyocopy, posterior colporrhapy    SALPINGO-OOPHORECTOMY Bilateral 2014    Dr Octavio Lange  10/17/2016    gastritis, superficial ulcer in esophagus, Dr. Shoemaker Apt       Social History:    Social History     Tobacco Use    Smoking status: Never Smoker    Smokeless tobacco: Never Used   Substance Use Topics    Alcohol use: No                                Counseling given: Not Answered      Vital Signs (Current):   Vitals:    21 0659   BP: 122/81   Pulse: 82   Resp: 16   Temp: 36.9 °C (98.4 °F)   TempSrc: Oral   SpO2: 96%   Weight: 215 lb (97.5 kg)   Height: 5' 4\" (1.626 m)                                              BP Readings from Last 3 Encounters:   21 122/81   21 130/86   21 110/80       NPO Status: Time of last liquid consumption:                         Time of last solid consumption: 0800                        Date of last liquid consumption: 21                        Date of last solid food consumption: 21    BMI:   Wt Readings from Last 3 Encounters:   21 215 lb (97.5 kg)   21 213 lb 14.4 oz (97 kg)   21 223 lb (101.2 kg)     Body mass index is 36.9 kg/m².     CBC:   Lab Results   Component Value Date    WBC 7.0 2021    RBC 4.71 2021    HGB 13.2 2021    HCT 41.6 2021    MCV 88.3 2021    RDW 12.6 2021     2021       CMP:   Lab Results   Component Value Date     2021    K 4.1 2021     2021    CO2 26 2021    BUN 16 2021    CREATININE 0.67 2021    GFRAA >60 2021    LABGLOM >60 2021

## 2021-11-30 NOTE — ANESTHESIA POSTPROCEDURE EVALUATION
Department of Anesthesiology  Postprocedure Note    Patient: Kushal Marrero  MRN: [id number]  YOB: 1955  Date of evaluation: 11/30/2021  Time:  8:16 AM     Procedure Summary     Date: 11/30/21 Room / Location: [address]    Anesthesia Start: 6667 Anesthesia Stop: 6172    Procedure: COLONOSCOPY WITH BIOPSY (N/A ) Diagnosis: (history colon polyp)    Surgeons: Cb Louise MD Responsible Provider: LOIS Manzo CRNA    Anesthesia Type: general, TIVA ASA Status: 2          Anesthesia Type: general, TIVA    Rylee Phase I: Rylee Score: 10    Rylee Phase II: Rylee Score: 9    Last vitals: Reviewed and per EMR flowsheets.        Anesthesia Post Evaluation    Patient location during evaluation: PACU  Patient participation: complete - patient participated  Level of consciousness: awake and alert  Pain score: 0  Airway patency: patent  Nausea & Vomiting: no nausea and no vomiting  Complications: no  Cardiovascular status: blood pressure returned to baseline and hemodynamically stable  Respiratory status: acceptable  Hydration status: euvolemic

## 2021-11-30 NOTE — OP NOTE
COLONOSCOPY PROCEDURE NOTE:      Pre op diagnosis:      Pt is 61 yo female with hx of polyp and a + family hx colon cancer. Last CS 2016  No symptoms          Operative Procedure:    1. Colonoscopy with cold bx    Surgeon:    Dr. Roland Lopez     Anesthesia:    IV sedation per CRNA    EBL:  minimal    Procedure:    Patient was taken to the endoscopy suite and placed in a left lateral decubitus position. They were given IV sedation as mentioned above. A digital rectal exam was performed. There were no masses and anal tone was normal.  The colonoscope was inserted into the rectum and carefully manipulated up through the sigmoid colon, transverse colon, right colon and into the cecum. The cecum was identified by the ileal cecal valve and transabdominal illumination. Then the scope was slowly withdrawn. The scope was retroflexed in the rectum and the dentate line was examined. The scope was removed. The patient tolerated the procedure well. The following findings were noted. Final Diagnosis:    1.  5 mm polyps at 100,40 and 20 cm, removed with cold bx forceps    Plan:    1. Await bx  2. Repeat CS in 3-5 years based on pathology    ADDENDUM:  Endo Review :  12/6/2021    Pathology:    Diagnosis --   1.  COLON, POLYP  CM, BIOPSY:        -  ADENOMATOUS POLYP (TUBULAR ADENOMA). 2.  COLON, POLYP AT 40 CM, BIOPSY:        -  HYPERPLASTIC POLYP. 3.  COLON, 20 CM, POLYP, BIOPSIES:        -  ADENOMATOUS POLYP (TUBULAR ADENOMA). Plan:    1.   Will do repeat CS in 3 years due to 3 or more polyps and positive fam hx colon cancer

## 2021-11-30 NOTE — H&P
Biju George is a 60 y.o. female      CC:    History of polyp  + fam hx    HISTORY OF PRESENT ILLNESS:    Pt is 61 yo female with hx of polyp and a + family hx colon cancer.   Last CS 2016  No symptoms        Review of Systems:    General:  Fever: Negative  Weight Change:Negative  Night Sweats: Negative    Eye:  Blurry Vision:Negative  Double Vision: Negative    Ent:  Headaches: Negative  Sore throat: Negative    Allergy/Immunology:  Hives: Negative  Latex allergy: Negative    Hematology/Lymphatic:  Bleeding Problems: Negative  Blood Clots: Negative  Swollen Lymph Nodes: Negative    Lungs:  Cough: Negative  SOB: Negative  Wheezing:Negative    Cardiovascular:  Chest Pain: Negative  Palpitations:Negative    GI:   Decreased Appetite: Negative  Heartburn: Negative  Dysphagia: Negative  Nausea/Vomiting: Negative  Abdominal Pain: Negative  Change in Bowels:Negative  Constipation: Negative  Diarrhea: Negative  Rectal Bleeding: Negative    :   Dysuria: Negative  Increase Urinary Frequency/Urgency: Negative    Neuro:  Seizures: Negative  Confusion: Negative        PAST MEDICAL HISTORY:      Family History   Problem Relation Age of Onset    Cancer Mother         PANCREATIC    Cancer Sister         BREAST     Social History     Socioeconomic History    Marital status:      Spouse name: Not on file    Number of children: Not on file    Years of education: Not on file    Highest education level: Not on file   Occupational History    Not on file   Tobacco Use    Smoking status: Never Smoker    Smokeless tobacco: Never Used   Substance and Sexual Activity    Alcohol use: No    Drug use: No    Sexual activity: Not on file   Other Topics Concern    Not on file   Social History Narrative    Not on file     Social Determinants of Health     Financial Resource Strain: Low Risk     Difficulty of Paying Living Expenses: Not hard at all   Food Insecurity: No Food Insecurity    Worried About Running Out of [employer] in the Last Year: Never true    Ran Out of Food in the Last Year: Never true   Transportation Needs:     Lack of Transportation (Medical): Not on file    Lack of Transportation (Non-Medical):  Not on file   Physical Activity:     Days of Exercise per Week: Not on file    Minutes of Exercise per Session: Not on file   Stress:     Feeling of Stress : Not on file   Social Connections:     Frequency of Communication with Friends and Family: Not on file    Frequency of Social Gatherings with Friends and Family: Not on file    Attends Anglican Services: Not on file    Active Member of [address] [place] [address] or Organizations: Not on file    Attends Club or Organization Meetings: Not on file    Marital Status: Not on file   Intimate Partner Violence:     Fear of Current or Ex-Partner: Not on file    Emotionally Abused: Not on file    Physically Abused: Not on file    Sexually Abused: Not on file   Housing Stability:     Unable to Pay for Housing in the Last Year: Not on file    Number of [place] in the Last Year: Not on file    Unstable Housing in the Last Year: Not on file     Past Surgical History:   Procedure Laterality Date    BLADDER SUSPENSION      Dr Deepthi Baker  1999    Laser cone due to HSIL     SECTION      CHOLECYSTECTOMY      COLONOSCOPY  10/17/2016    hyperplastic  polyp, Dr. Rosanne Mckinnon, VAGINAL  2014    Dr Dominique Hsieh; VH with BSO, external [facility] culdoplasty with uterosacral ligament suspension, anterior colprrhapy, cystourethyocopy, posterior colporrhapy    SALPINGO-OOPHORECTOMY Bilateral 2014    Dr Bettina Tee  10/17/2016    gastritis, superficial ulcer in esophagus, Dr. Prasanth Dominguez     Past Medical History:   Diagnosis Date    Allergic rhinitis     Gallstones     History of chicken pox     HSIL (high grade squamous intraepithelial lesion) on Pap smear of cervix 1999 with HPV noted on path report. Did have Laser Cone procedure     No current facility-administered medications on file prior to encounter. Current Outpatient Medications on File Prior to Encounter   Medication Sig Dispense Refill    omeprazole (PRILOSEC) 20 MG delayed release capsule TAKE 1 CAPSULE BY MOUTH ONE TIME A DAY 90 capsule 3    loratadine (CLARITIN) 10 MG tablet Take 10 mg by mouth daily       ibuprofen (ADVIL;MOTRIN) 600 MG tablet Take 600 mg by mouth every 8 hours as needed for Pain.  Multiple Vitamins-Minerals (MULTIVITAMIN PO) Take 1 tablet by mouth daily.  clobetasol (TEMOVATE) 0.05 % cream Apply topically 2 times daily to affected area. 60 g 1    valACYclovir (VALTREX) 1 g tablet TAKE TWO TABLETS BY MOUTH TWO TIMES A DAY FOR 1 DAY. (TOTAL OF 4 TABLETS PER COURSE OF TREATMENT) (Patient not taking: Reported on 8/30/2021) 12 tablet 1     Allergies as of 09/22/2021 - Fully Reviewed 08/30/2021   Allergen Reaction Noted    Adhesive tape  08/22/2016    Bactrim [sulfamethoxazole-trimethoprim] Rash 08/06/2013         PHYSICAL EXAM:    Blood pressure 122/81, pulse 82, temperature 98.4 °F (36.9 °C), temperature source Oral, resp. rate 16, height 5' 4\" (1.626 m), weight 215 lb (97.5 kg), SpO2 96 %, not currently breastfeeding. Gen:  A and O x 3, NAD, well nourished  Eyes:  Sclera non icterus, PERRL  Head:  Normocephalic, non-tender  Neck:  Supple, no adenopathy, thyroid non tender and no masses,no carotid bruits  Lungs:  CTA, symmetrical  Chest:  RRR, no murmurs  Abd:  Soft, NT, ND, no HSM, no hernias, no bruits  Ext:  No edema, no cyanosis  Psych: reveals appropriate mood, memory and judgment,  Neuro:  Reveals no gross motor or sensory deficits,   Msk:  5/5 strength all 4 extremities, no joint tenderness          ASSESS MENT:    Pt is 59 yo female with hx of polyp and a + family hx colon cancer.   Last CS 2016  No symptoms          PLAN:

## 2021-12-01 LAB — SURGICAL PATHOLOGY REPORT: NORMAL

## 2021-12-06 ENCOUNTER — TELEPHONE (OUTPATIENT)
Dept: SURGERY | Age: 66
End: 2021-12-06

## 2021-12-06 NOTE — LETTER
Jabier Sanchez [facility] [address]  [address]  Phone: [phone]  Fax: [phone]    Mayito Quintanilla MD      12/6/2021    Dear Sukhi Cordoba,      Dr. Geovanna Souza reviewed the results of your recent colonoscopy. He removed 3 polyps. All were benign (no cancer)  . His recommendations are to be scoped again in 3 years, due to the number of polyps you had and due to your family history. If you have any questions or concerns regarding your test results or our recommendations, please call the office at [phone].       Sincerely,           [place] [facility]

## 2021-12-06 NOTE — TELEPHONE ENCOUNTER
Letter mailed to patient with colonoscopy results and Dr. Eloise Barnett recommendations. Results entered in chart history, health maintenance, recall list, and forwarded to PCP.

## 2021-12-07 RX ORDER — VALACYCLOVIR HYDROCHLORIDE 1 G/1
TABLET, FILM COATED ORAL
Qty: 12 TABLET | Refills: 1 | Status: SHIPPED | OUTPATIENT
Start: 2021-12-07 | End: 2022-05-11 | Stop reason: SINTOL

## 2021-12-07 NOTE — TELEPHONE ENCOUNTER
Darius Benitez called requesting a refill of the below medication which has been pended for you:     Requested Prescriptions     Pending Prescriptions Disp Refills    valACYclovir (VALTREX) 1 g tablet [Pharmacy Med Name: VALACYCLOVIR HCL 1GM TABS] 12 tablet 1     Sig: TAKE TWO TABLETS BY MOUTH TWICE A DAY FOR 1 DAY (TOTAL OF 4 TABLETS PER COURSE OF TREATMENT       Last Appointment Date: 8/30/2021  Next Appointment Date: 5/11/22    Allergies   Allergen Reactions    Adhesive Tape     Bactrim [Sulfamethoxazole-Trimethoprim] Rash

## 2021-12-17 ENCOUNTER — PATIENT MESSAGE (OUTPATIENT)
Dept: FAMILY MEDICINE CLINIC | Age: 66
End: 2021-12-17

## 2021-12-17 NOTE — TELEPHONE ENCOUNTER
From: Anny Clay  To: Dr. Andrew Williamson: 12/17/2021 12:54 PM EST  Subject: Covid vaccine    Gema Osorio Just,   Quick question. I had the J&J vaccine and the J&J booster. Watching the news I am wondering if I should get a different vaccine or booster. Please let me know your thoughts?

## 2022-04-25 ENCOUNTER — INITIAL CONSULT (OUTPATIENT)
Dept: ONCOLOGY | Age: 67
End: 2022-04-25
Payer: MEDICARE

## 2022-04-25 DIAGNOSIS — Z80.42 FAMILY HISTORY OF PROSTATE CANCER: ICD-10-CM

## 2022-04-25 DIAGNOSIS — Z80.3 FAMILY HISTORY OF BREAST CANCER: Primary | ICD-10-CM

## 2022-04-25 DIAGNOSIS — Z80.0 FAMILY HISTORY OF PANCREATIC CANCER: ICD-10-CM

## 2022-04-25 DIAGNOSIS — Z84.81 FAMILY HISTORY OF BRCA2 GENE POSITIVE: ICD-10-CM

## 2022-04-25 PROCEDURE — 96040 PR GENETIC COUNSELING, EACH 30 MIN: CPT | Performed by: GENETIC COUNSELOR, MS

## 2022-04-25 NOTE — PROGRESS NOTES
3 [facility]   Hereditary Cancer Risk Assessment      Name: Tracy Duran   YOB: 1955   Date of Consultation: 4/25/22      Ms. Saira Morgan was seen at the [facility] for genetic counseling on 4/25/22. Ms. Saira Morgan was referred by Dr. Kathi Brothers DO due to her family history of cancer and that her niece was recently found to carry a hereditary mutation in the BRCA2 gene. PERSONAL AND FAMILY HISTORY   Ms. Saira Morgan is a 73 y.o.  female with no personal history of cancer. She reports menarche at age 16, first child at age 33 and underwent total hysterectomy and bilateral salpingo oophorectomy at age 63. Ms. Saira Morgan has one son (45) and two daughters (43 and 31y). Ms. Scott Mcgee family history of cancer includes a full sister with breast cancer at age 48 and 54 and later a recurrence. This sister's daughter (Ms. Scott Mcgee niece) has not had cancer; however, she did undergo genetic testing and was found to carry a pathogenic mutation in the BRCA2 gene. Ms. Saira Morgan has two full brothers as well, one with prostate cancer and the other with skin cancer. Ms. Scott Mcgee mother passed away at age 47 from pancreatic cancer. She has a large maternal family with 5 uncles and 2 aunts, all living to older ages without any cancer diagnoses. Ms. Scott Mcgee father had colon cancer at age 80. She also has a large paternal family with 5 aunts and 1 uncle. At least three of the aunts had breast cancer along with at least one paternal first cousin with breast cancer. Her uncle had prostate cancer along with at least 3 paternal first cousins. No other relatives have undergone genetic testing that she is aware. Ms. Saira Morgan reports [place] ancestry and there is no known Ashkenazi Mu-ism heritage.      RISK ASSESSMENT   We discussed that approximately 5-10% of cancers are due to a hereditary gene mutation which causes an increased risk for certain cancers. Ms. Mali Kramer niece recently underwent genetic testing and was found to carry a pathogenic BRCA2 gene mutation. The specific mutation is called [id number]. Her niece's mother (Ms. Mali Kramer full sister) has not undergone genetic testing for the familial mutation; however, given her personal and family history of cancer it is suspected that she is an obligate carrier. Thus, Ms. Tanner Pineda has a 50% chance to carry the familial mutation. Additionally, it is not clear if the BRCA2 gene mutation was inherited from Ms. Mali Kramer mother or father. While there is a strong history of breast and prostate cancer on her paternal side, her mother had pancreatic cancer. Thus, the cancer history on each side of the family could be explained by the BRCA2 gene. In summary, Ms. Tanner Pineda meets criteria for comprehensive BRCA1/2 testing given that she has a maternal first degree relative with pancreatic cancer and paternal family history of at least 3 relatives with breast cancer and neither are confirmed to be due to the familial BRCA2 gene mutation which her niece carries. The NCCN guidelines also recognize that an individual's personal and/or family history may be explained by more than one inherited cancer syndrome. Thus, a multi-gene panel may be more efficient, more cost effecting, and increases the yield of detecting a hereditary mutation which would impact medical management. Given her personal and/or family history, we recommend testing for the following genes at minimum: JAMIE, CHEK2, NBN, and PALB2. DISCUSSION  We discussed that the BRCA2 gene is associated with an increased lifetime risk for female breast cancer and ovarian cancer, primarily     We discussed the risks, benefits, and limitations of genetic testing. Possible test results were discussed as well as potential screening and prevention strategies.  Specifically, we discussed increased breast cancer screening including annual breast MRIs along with annual mammograms. The option for prophylactic mastectomy is also a consideration. Lastly, we discussed that the results of Ms. Maloney Ramya genetic testing may be beneficial in defining her risk for cancer as well as for her family members. SUMMARY & PLAN  1) Ms. Akanksha Norris meets the NCCN criteria for genetic testing for the familial BRCA2 gene mutation. She also meets criteria for comprehensive BRCA1/2 testing by having a maternal and paternal family history suggestive of a hereditary gene mutation, neither of which have been confirmed to be caused by the familial BRCA2 mutation. For this reason, she is electing to proceed with the [address] Expanded gene panel. 2) Informed consent was obtained and a blood sample was sent to [facility]. We will call Ms. Akanksha Norris with results as soon as they are available. A follow up appointment may be recommended. A summary letter with results and final medical management recommendations will be sent once available. A total of 40 minutes were spent face to face with Ms. Akanksha Norris and 50% of the time was spent educating and counseling. The [address] [facility] would be glad to offer our assistance should you have any questions or concerns about this information. Please feel free to contact us at [phone]. Neal Joseph.  Nikia Verma MS, General acute hospital   Licensed Genetic Counselor

## 2022-05-04 ENCOUNTER — HOSPITAL ENCOUNTER (OUTPATIENT)
Dept: LAB | Age: 67
Discharge: HOME | End: 2022-05-04
Payer: MEDICARE

## 2022-05-04 DIAGNOSIS — R73.01 IMPAIRED FASTING GLUCOSE: ICD-10-CM

## 2022-05-04 DIAGNOSIS — E78.2 MIXED HYPERLIPIDEMIA: ICD-10-CM

## 2022-05-04 LAB
ABSOLUTE EOS #: 0.46 K/UL (ref 0–0.44)
ABSOLUTE IMMATURE GRANULOCYTE: 0.03 K/UL (ref 0–0.3)
ABSOLUTE LYMPH #: 2.3 K/UL (ref 1.1–3.7)
ABSOLUTE MONO #: 0.45 K/UL (ref 0.1–1.2)
ALBUMIN SERPL-MCNC: 4.3 G/DL (ref 3.5–5.2)
ALBUMIN/GLOBULIN RATIO: 1.5 (ref 1–2.5)
ALP BLD-CCNC: 86 U/L (ref 35–104)
ALT SERPL-CCNC: 12 U/L (ref 5–33)
ANION GAP SERPL CALCULATED.3IONS-SCNC: 11 MMOL/L (ref 9–17)
AST SERPL-CCNC: 15 U/L
BASOPHILS # BLD: 1 % (ref 0–2)
BASOPHILS ABSOLUTE: 0.06 K/UL (ref 0–0.2)
BILIRUB SERPL-MCNC: 1.04 MG/DL (ref 0.3–1.2)
BUN BLDV-MCNC: 18 MG/DL (ref 8–23)
BUN/CREAT BLD: 25 (ref 9–20)
CALCIUM SERPL-MCNC: 9.1 MG/DL (ref 8.6–10.4)
CHLORIDE BLD-SCNC: 107 MMOL/L (ref 98–107)
CHOLESTEROL/HDL RATIO: 3.5
CHOLESTEROL: 188 MG/DL
CO2: 27 MMOL/L (ref 20–31)
CREAT SERPL-MCNC: 0.72 MG/DL (ref 0.5–0.9)
EOSINOPHILS RELATIVE PERCENT: 7 % (ref 1–4)
ESTIMATED AVERAGE GLUCOSE: 100 MG/DL
GFR AFRICAN AMERICAN: >60 ML/MIN
GFR NON-AFRICAN AMERICAN: >60 ML/MIN
GFR SERPL CREATININE-BSD FRML MDRD: ABNORMAL ML/MIN/{1.73_M2}
GLUCOSE BLD-MCNC: 98 MG/DL (ref 70–99)
HBA1C MFR BLD: 5.1 % (ref 4–6)
HCT VFR BLD CALC: 40.6 % (ref 36.3–47.1)
HDLC SERPL-MCNC: 54 MG/DL
HEMOGLOBIN: 13.2 G/DL (ref 11.9–15.1)
IMMATURE GRANULOCYTES: 0 %
LDL CHOLESTEROL: 113 MG/DL (ref 0–130)
LYMPHOCYTES # BLD: 33 % (ref 24–43)
MCH RBC QN AUTO: 28 PG (ref 25.2–33.5)
MCHC RBC AUTO-ENTMCNC: 32.5 G/DL (ref 25.2–33.5)
MCV RBC AUTO: 86.2 FL (ref 82.6–102.9)
MONOCYTES # BLD: 7 % (ref 3–12)
NRBC AUTOMATED: 0 PER 100 WBC
PDW BLD-RTO: 12.8 % (ref 11.8–14.4)
PLATELET # BLD: 326 K/UL (ref 138–453)
PMV BLD AUTO: 8.5 FL (ref 8.1–13.5)
POTASSIUM SERPL-SCNC: 4.1 MMOL/L (ref 3.7–5.3)
RBC # BLD: 4.71 M/UL (ref 3.95–5.11)
SEG NEUTROPHILS: 52 % (ref 36–65)
SEGMENTED NEUTROPHILS ABSOLUTE COUNT: 3.61 K/UL (ref 1.5–8.1)
SODIUM BLD-SCNC: 145 MMOL/L (ref 135–144)
TOTAL PROTEIN: 7.1 G/DL (ref 6.4–8.3)
TRIGL SERPL-MCNC: 104 MG/DL
WBC # BLD: 6.9 K/UL (ref 3.5–11.3)

## 2022-05-04 PROCEDURE — 80053 COMPREHEN METABOLIC PANEL: CPT

## 2022-05-04 PROCEDURE — 85025 COMPLETE CBC W/AUTO DIFF WBC: CPT

## 2022-05-04 PROCEDURE — 80061 LIPID PANEL: CPT

## 2022-05-04 PROCEDURE — 83036 HEMOGLOBIN GLYCOSYLATED A1C: CPT

## 2022-05-04 PROCEDURE — 36415 COLL VENOUS BLD VENIPUNCTURE: CPT

## 2022-05-08 ENCOUNTER — TELEPHONE (OUTPATIENT)
Dept: GENERAL RADIOLOGY | Age: 67
End: 2022-05-08

## 2022-05-08 DIAGNOSIS — Z12.31 VISIT FOR SCREENING MAMMOGRAM: Primary | ICD-10-CM

## 2022-05-08 NOTE — TELEPHONE ENCOUNTER
3/14/2022         RE: Jennifer Cervantes  [address]  [facility] [address]        Dear Colleague,    Thank you for referring your patient, Jennifer Cervantes, to the [facility]. Please see a copy of my visit note below.    Nursing Note  Jennifer Cervantes [facility] for:   Chief Complaint   Patient presents with     Infusion     IVF, pain meds     During today's [facility] appointment, orders from Dr. Duncan were completed.  Frequency: as needed    Progress note:  Patient identification verified by name and date of birth.  Assessment completed.  Vitals recorded in Doc Flowsheets.  Patient was provided with education regarding medication/procedure and possible side effects.  Patient verbalized understanding.     present during visit today: Not Applicable.    Treatment Conditions: Non-applicable.    Premedications: were not ordered.    Drug Waste Record: No    Infusion length and rate:  infusion given over approximately 2 hours    Labs: were not ordered for this appointment.    Vascular access: port accessed today.    Is the next appt scheduled? prn  Asymptomatic COVID test completed? no    Post Infusion Assessment:  Patient tolerated infusion without incident.     Discharge Plan:   Follow up plan of care with: ordering provider as scheduled.  Discharge instructions were reviewed with patient.  Patient/representative verbalized understanding of discharge instructions and all questions answered.  Patient discharged from [facility] in stable condition.    Yina Ford RN    Administrations This Visit     dextrose 5% and 0.45% NaCl BOLUS     Admin Date  03/14/2022 Action  New Bag Dose  1,000 mL Rate  500 mL/hr Route  Intravenous Administered By  Yina Ford RN          heparin 100 UNIT/ML injection 5 mL     Admin Date  03/14/2022 Action  Given Dose  5 mL  Could you please put in a screening mammogram order.  [id number] Thank you Route  Intracatheter Administered By  Yina Ford, RN          morphine (PF) injection 2 mg     Admin Date  03/14/2022 Action  Given Dose  2 mg Route  Intravenous Administered By  Yina Ford RN           Admin Date  03/14/2022 Action  Given Dose  2 mg Route  Intravenous Administered By  Yina Ford RN           Admin Date  03/14/2022 Action  Given Dose  2 mg Route  Intravenous Administered By  Yina Ford, JOE              /77 (BP Location: Left arm, Patient Position: Semi-Short's)   Pulse 104   Temp 98.2  F (36.8  C) (Oral)   Resp 16         Again, thank you for allowing me to participate in the care of your patient.      Sincerely,    [facility]

## 2022-05-11 ENCOUNTER — OFFICE VISIT (OUTPATIENT)
Dept: FAMILY MEDICINE CLINIC | Age: 67
End: 2022-05-11
Payer: MEDICARE

## 2022-05-11 VITALS
SYSTOLIC BLOOD PRESSURE: 138 MMHG | WEIGHT: 217 LBS | TEMPERATURE: 97.5 F | DIASTOLIC BLOOD PRESSURE: 80 MMHG | HEIGHT: 64 IN | OXYGEN SATURATION: 95 % | HEART RATE: 88 BPM | RESPIRATION RATE: 16 BRPM | BODY MASS INDEX: 37.05 KG/M2

## 2022-05-11 DIAGNOSIS — Z23 NEED FOR PNEUMOCOCCAL VACCINE: ICD-10-CM

## 2022-05-11 DIAGNOSIS — R73.01 IMPAIRED FASTING GLUCOSE: Primary | ICD-10-CM

## 2022-05-11 DIAGNOSIS — E78.2 MIXED HYPERLIPIDEMIA: ICD-10-CM

## 2022-05-11 PROCEDURE — G0009 ADMIN PNEUMOCOCCAL VACCINE: HCPCS | Performed by: FAMILY MEDICINE

## 2022-05-11 PROCEDURE — 90677 PCV20 VACCINE IM: CPT | Performed by: FAMILY MEDICINE

## 2022-05-11 PROCEDURE — 99214 OFFICE O/P EST MOD 30 MIN: CPT | Performed by: FAMILY MEDICINE

## 2022-05-11 PROCEDURE — 99212 OFFICE O/P EST SF 10 MIN: CPT | Performed by: FAMILY MEDICINE

## 2022-05-11 ASSESSMENT — PATIENT HEALTH QUESTIONNAIRE - PHQ9
SUM OF ALL RESPONSES TO PHQ QUESTIONS 1-9: 0
SUM OF ALL RESPONSES TO PHQ9 QUESTIONS 1 & 2: 0
1. LITTLE INTEREST OR PLEASURE IN DOING THINGS: 0
SUM OF ALL RESPONSES TO PHQ QUESTIONS 1-9: 0
2. FEELING DOWN, DEPRESSED OR HOPELESS: 0
SUM OF ALL RESPONSES TO PHQ QUESTIONS 1-9: 0
SUM OF ALL RESPONSES TO PHQ QUESTIONS 1-9: 0

## 2022-05-11 ASSESSMENT — ENCOUNTER SYMPTOMS
SHORTNESS OF BREATH: 0
WHEEZING: 0
TROUBLE SWALLOWING: 0
SORE THROAT: 0
EYE REDNESS: 0
CONSTIPATION: 0
NAUSEA: 0
EYE DISCHARGE: 0
SINUS PRESSURE: 0
COUGH: 0
DIARRHEA: 0
ABDOMINAL PAIN: 0
VOMITING: 0
RHINORRHEA: 0

## 2022-05-11 NOTE — PROGRESS NOTES
2022     Mary Stephenson (:  1955) is a 73 y.o. female, here for evaluation of the following medical concerns:    HPI  Patient comes in today for follow up of chronic health issues Patient overall is doing relatively well. Does not have any acute concerns to discuss today. Does have a known history of impaired fasting glucose and her blood sugar level is stable and controlled with dietary efforts. She has really tried to stay physically active and does try to watch her diet. Has a known history of hyperlipidemia and her cholesterol levels are relatively stable and controlled with dietary efforts. Did encourage continued lipid lowering diet to keep this optimally controlled. LDL is a little bit above ideal range but still within in acceptable range. Patient otherwise has no other acute issues. .  Patient's recent lab reports are as follows:    Results for orders placed or performed during the hospital encounter of 22   Hemoglobin A1C   Result Value Ref Range    Hemoglobin A1C 5.1 4.0 - 6.0 %    Estimated Avg Glucose 100 mg/dL   CBC Auto Differential   Result Value Ref Range    WBC 6.9 3.5 - 11.3 k/uL    RBC 4.71 3.95 - 5.11 m/uL    Hemoglobin 13.2 11.9 - 15.1 g/dL    Hematocrit 40.6 36.3 - 47.1 %    MCV 86.2 82.6 - 102.9 fL    MCH 28.0 25.2 - 33.5 pg    MCHC 32.5 25.2 - 33.5 g/dL    RDW 12.8 11.8 - 14.4 %    Platelets 787 243 - 220 k/uL    MPV 8.5 8.1 - 13.5 fL    NRBC Automated 0.0 0.0 per 100 WBC    Seg Neutrophils 52 36 - 65 %    Lymphocytes 33 24 - 43 %    Monocytes 7 3 - 12 %    Eosinophils % 7 (H) 1 - 4 %    Basophils 1 0 - 2 %    Immature Granulocytes 0 0 %    Segs Absolute 3.61 1.50 - 8.10 k/uL    Absolute Lymph # 2.30 1.10 - 3.70 k/uL    Absolute Mono # 0.45 0.10 - 1.20 k/uL    Absolute Eos # 0.46 (H) 0.00 - 0.44 k/uL    Basophils Absolute 0.06 0.00 - 0.20 k/uL    Absolute Immature Granulocyte 0.03 0.00 - 0.30 k/uL   Comprehensive Metabolic Panel   Result Value Ref Range Glucose 98 70 - 99 mg/dL    BUN 18 8 - 23 mg/dL    CREATININE 0.72 0.50 - 0.90 mg/dL    Bun/Cre Ratio 25 (H) 9 - 20    Calcium 9.1 8.6 - 10.4 mg/dL    Sodium 145 (H) 135 - 144 mmol/L    Potassium 4.1 3.7 - 5.3 mmol/L    Chloride 107 98 - 107 mmol/L    CO2 27 20 - 31 mmol/L    Anion Gap 11 9 - 17 mmol/L    Alkaline Phosphatase 86 35 - 104 U/L    ALT 12 5 - 33 U/L    AST 15 <32 U/L    Total Bilirubin 1.04 0.3 - 1.2 mg/dL    Total Protein 7.1 6.4 - 8.3 g/dL    Albumin 4.3 3.5 - 5.2 g/dL    Albumin/Globulin Ratio 1.5 1.0 - 2.5    GFR Non-African American >60 >60 mL/min    GFR African American >60 >60 mL/min    GFR Comment         Lipid Panel   Result Value Ref Range    Cholesterol 188 <200 mg/dL    HDL 54 >40 mg/dL    LDL Cholesterol 113 0 - 130 mg/dL    Chol/HDL Ratio 3.5 <5    Triglycerides 104 <150 mg/dL      Other review of systems are as noted below. Preventative measures are reviewed today. See health maintenance section for complete preventative plan of care. Did review patient's med list, allergies, social history, fam history, pmhx and pshx today as noted in the record. Review of Systems   Constitutional: Negative for chills, fatigue and fever. HENT: Negative for congestion, ear pain, postnasal drip, rhinorrhea, sinus pressure, sore throat and trouble swallowing. Eyes: Negative for discharge and redness. Respiratory: Negative for cough, shortness of breath and wheezing. Cardiovascular: Negative for chest pain. Gastrointestinal: Negative for abdominal pain, constipation, diarrhea, nausea and vomiting. Genitourinary: Negative for dysuria, flank pain, frequency and urgency. Musculoskeletal: Negative for arthralgias, myalgias and neck pain. Skin: Negative for rash and wound. Allergic/Immunologic: Negative for environmental allergies. Neurological: Negative for dizziness, weakness, light-headedness and headaches. Hematological: Negative for adenopathy.    Psychiatric/Behavioral: Negative. Prior to Visit Medications    Medication Sig Taking? Authorizing Provider   valACYclovir (VALTREX) 1 g tablet TAKE TWO TABLETS BY MOUTH TWICE A DAY FOR 1 DAY (TOTAL OF 4 TABLETS PER COURSE OF TREATMENT  Mayte Husain DO   clobetasol (TEMOVATE) 0.05 % cream Apply topically 2 times daily to affected area. Mayte Husain DO   omeprazole (PRILOSEC) 20 MG delayed release capsule TAKE 1 CAPSULE BY MOUTH ONE TIME A DAY  Michael Hurtado MD   loratadine (CLARITIN) 10 MG tablet Take 10 mg by mouth daily   Historical Provider, MD   ibuprofen (ADVIL;MOTRIN) 600 MG tablet Take 600 mg by mouth every 8 hours as needed for Pain. Historical Provider, MD   Multiple Vitamins-Minerals (MULTIVITAMIN PO) Take 1 tablet by mouth daily. Historical Provider, MD        Social History     Tobacco Use    Smoking status: Never Smoker    Smokeless tobacco: Never Used   Substance Use Topics    Alcohol use: No        There were no vitals filed for this visit. Estimated body mass index is 36.9 kg/m² as calculated from the following:    Height as of 11/30/21: 5' 4\" (1.626 m). Weight as of 11/30/21: 215 lb (97.5 kg). Physical Exam  Vitals and nursing note reviewed. Constitutional:       General: She is not in acute distress. Appearance: Normal appearance. She is well-developed. She is not diaphoretic. HENT:      Head: Normocephalic and atraumatic. Right Ear: Tympanic membrane, ear canal and external ear normal.      Left Ear: Tympanic membrane, ear canal and external ear normal.      Nose: Nose normal.      Mouth/Throat:      Mouth: Mucous membranes are moist.      Pharynx: Oropharynx is clear. No oropharyngeal exudate. Eyes:      General:         Right eye: No discharge. Left eye: No discharge. Conjunctiva/sclera: Conjunctivae normal.      Pupils: Pupils are equal, round, and reactive to light. Neck:      Thyroid: No thyromegaly.    Cardiovascular:      Rate and Rhythm: Normal rate and regular rhythm. Heart sounds: Normal heart sounds. Pulmonary:      Effort: Pulmonary effort is normal.      Breath sounds: Normal breath sounds. No wheezing or rales. Abdominal:      General: Bowel sounds are normal. There is no distension. Palpations: Abdomen is soft. Tenderness: There is no abdominal tenderness. Musculoskeletal:      Cervical back: Normal range of motion and neck supple. Lymphadenopathy:      Cervical: No cervical adenopathy. Skin:     General: Skin is warm and dry. Findings: No rash. Neurological:      Mental Status: She is alert and oriented to person, place, and time. Psychiatric:         Behavior: Behavior normal.         Thought Content: Thought content normal.         Judgment: Judgment normal.           ASSESSMENT/PLAN:    Encounter Diagnoses   Name Primary?  Impaired fasting glucose Yes    Mixed hyperlipidemia     Need for pneumococcal vaccine      No orders of the defined types were placed in this encounter. Orders Placed This Encounter   Procedures    Pneumococcal Conjugate PCV20, PF (Prevnar 20)    CBC with Auto Differential     Standing Status:   Future     Standing Expiration Date:   5/11/2023    Comprehensive Metabolic Panel     Standing Status:   Future     Standing Expiration Date:   5/11/2023    Hemoglobin A1C     Standing Status:   Future     Standing Expiration Date:   5/11/2023    Lipid Panel     Standing Status:   Future     Standing Expiration Date:   5/11/2023     Order Specific Question:   Is Patient Fasting?/# of Hours     Answer:   12 hours     Patient is to continue to follow a low carb/low sugar/low fat diet and increased exercise for optimal blood sugar and cholesterol control. Patient is to return to my office in 1 year for a routine follow-up visit or sooner if any further further issues or concerns arise.     (Please note that portions of this note were completed with a voice recognition program. Efforts were made to edit the dictations but occasionally words are mis-transcribed.)      No follow-ups on file. An electronic signature was used to authenticate this note.     --Teetee Aranda DO on 5/11/2022 at 7:23 AM

## 2022-05-11 NOTE — PATIENT INSTRUCTIONS
Hospital Outpatient Visit on 05/04/2022   Component Date Value Ref Range Status    Hemoglobin A1C 05/04/2022 5.1  4.0 - 6.0 % Final    Estimated Avg Glucose 05/04/2022 100  mg/dL Final    Comment: The ADA and AACC recommend providing the estimated average glucose result to permit better   patient understanding of their HBA1c result.       WBC 05/04/2022 6.9  3.5 - 11.3 k/uL Final    RBC 05/04/2022 4.71  3.95 - 5.11 m/uL Final    Hemoglobin 05/04/2022 13.2  11.9 - 15.1 g/dL Final    Hematocrit 05/04/2022 40.6  36.3 - 47.1 % Final    MCV 05/04/2022 86.2  82.6 - 102.9 fL Final    MCH 05/04/2022 28.0  25.2 - 33.5 pg Final    MCHC 05/04/2022 32.5  25.2 - 33.5 g/dL Final    RDW 05/04/2022 12.8  11.8 - 14.4 % Final    Platelets 73/20/8841 326  138 - 453 k/uL Final    MPV 05/04/2022 8.5  8.1 - 13.5 fL Final    NRBC Automated 05/04/2022 0.0  0.0 per 100 WBC Final    Seg Neutrophils 05/04/2022 52  36 - 65 % Final    Lymphocytes 05/04/2022 33  24 - 43 % Final    Monocytes 05/04/2022 7  3 - 12 % Final    Eosinophils % 05/04/2022 7* 1 - 4 % Final    Basophils 05/04/2022 1  0 - 2 % Final    Immature Granulocytes 05/04/2022 0  0 % Final    Segs Absolute 05/04/2022 3.61  1.50 - 8.10 k/uL Final    Absolute Lymph # 05/04/2022 2.30  1.10 - 3.70 k/uL Final    Absolute Mono # 05/04/2022 0.45  0.10 - 1.20 k/uL Final    Absolute Eos # 05/04/2022 0.46* 0.00 - 0.44 k/uL Final    Basophils Absolute 05/04/2022 0.06  0.00 - 0.20 k/uL Final    Absolute Immature Granulocyte 05/04/2022 0.03  0.00 - 0.30 k/uL Final    Glucose 05/04/2022 98  70 - 99 mg/dL Final    BUN 05/04/2022 18  8 - 23 mg/dL Final    CREATININE 05/04/2022 0.72  0.50 - 0.90 mg/dL Final    Bun/Cre Ratio 05/04/2022 25* 9 - 20 Final    Calcium 05/04/2022 9.1  8.6 - 10.4 mg/dL Final    Sodium 05/04/2022 145* 135 - 144 mmol/L Final    Potassium 05/04/2022 4.1  3.7 - 5.3 mmol/L Final    Chloride 05/04/2022 107  98 - 107 mmol/L Final    CO2 05/04/2022 27  20 - 31 mmol/L Final    Anion Gap 05/04/2022 11  9 - 17 mmol/L Final    Alkaline Phosphatase 05/04/2022 86  35 - 104 U/L Final    ALT 05/04/2022 12  5 - 33 U/L Final    AST 05/04/2022 15  <32 U/L Final    Total Bilirubin 05/04/2022 1.04  0.3 - 1.2 mg/dL Final    Total Protein 05/04/2022 7.1  6.4 - 8.3 g/dL Final    Albumin 05/04/2022 4.3  3.5 - 5.2 g/dL Final    Albumin/Globulin Ratio 05/04/2022 1.5  1.0 - 2.5 Final    GFR Non- 05/04/2022 >60  >60 mL/min Final    GFR  05/04/2022 >60  >60 mL/min Final    GFR Comment 05/04/2022        Final    Comment: Average GFR for 61-76 years old:   80 mL/min/1.73sq m  Chronic Kidney Disease:   <60 mL/min/1.73sq m  Kidney failure:   <15 mL/min/1.73sq m              eGFR calculated using average adult body mass. Additional eGFR calculator available at:        [employer]            Cholesterol 05/04/2022 188  <200 mg/dL Final    Comment:    Cholesterol Guidelines:      <200  Desirable   200-240  Borderline      >240  Undesirable         HDL 05/04/2022 54  >40 mg/dL Final    Comment:    HDL Guidelines:    <40     Undesirable   40-59    Borderline    >59     Desirable         LDL Cholesterol 05/04/2022 113  0 - 130 mg/dL Final    Comment:    LDL Guidelines:     <100    Desirable   100-129   Near to/above Desirable   130-159   Borderline      >159   Undesirable     Direct (measured) LDL and calculated LDL are not interchangeable tests.  Chol/HDL Ratio 05/04/2022 3.5  <5 Final            Triglycerides 05/04/2022 104  <150 mg/dL Final    Comment:    Triglyceride Guidelines:     <150   Desirable   150-199  Borderline   200-499  High     >499   Very high   Based on AHA Guidelines for fasting triglyceride, October 2012.

## 2022-05-27 ENCOUNTER — TELEPHONE (OUTPATIENT)
Dept: ONCOLOGY | Age: 67
End: 2022-05-27

## 2022-05-27 NOTE — TELEPHONE ENCOUNTER
3 [facility] [place] [facility]   Hereditary Cancer Risk Assessment      Name: Saul Kamara  YOB: 1955  Date of Results Disclosure: 5/27/22     HISTORY   Ms. Vini Kenney was seen for genetic counseling at the request of Dr. George Gutierrez DO due to her family history of cancer and that her niece was recently found to carry a BRCA2 gene mutation. At that time, Ms. Vini Kenney chose to pursue genetic testing for the familial BRCA2 gene mutation as well as the CancerNext Expanded + RNA gene panel gene panel. These results were discussed with Ms. Vini Kenney on 5/27/22. A summary of Ms. Poonam Conklin results and recommendations are below. 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)  This panel included the analysis of 77 genes associated with hereditary cancer including: AIP, ALK, APC, JAMIE, BAP1, BARD1, BLM, BMPR1A, BRCA1, BRCA2, BRIP1, CDC73, CDH1, CDK4, CDKN1B, CDKN2A, CHEK2, CTNNA1, DICER1, EGFR, EGLN1, EPCAM, FANCC, FH, FLCN, GALNT12, GREM1, HOXB13, KIF1B, KIT1, LZTR1, MAX, MEN1, MET, MITF, MLH1, MSH2, MSH3, MSH6, MUTYH, NBN, NF1, NF2, NTHL1, PALB2, PDGFRA, PHOX2B, PMS2, POLD1, POLE, POT1, HBDUW8W, PTCH1, PTEN, RAD51C, RAD51D, RB1, RECQL, RET, SDHA, SDHAF2, SDHB, SDHC, ,SDHD, SMAD4, SMARCA4, SMARCB1, SMARCE1, STK11, SUFU, TRWH477, TP53, TSC1, TSC2, VHL, and XRCC2. In addition, no clinically relevant aberrant RNA transcripts were detected in select genes. Please refer to genetic test report for technical details. The [id number] pathogenic mutation has previously been identified in families with typical and atypical Fanconi Anemia and thought to be hypomorphic.  This variant was previously described as clinically uncertain; however, it has more recently been identified in multiple families with hereditary breast and ovarian cancers. For these reasons, the mutation is considered pathogenic but may have reduced penetrance in regards to classic BRCA2 related cancer risks. Exact risk estimates are unknown at this time and clinical correlation is advised.      Ms. Jerome's family history is significant for a sister with multiple breast cancers and at least 4 aunts with breast cancer. Her sister has not had genetic testing but is at least an obligate carrier for the BRCA2 gene mutation as her daughter (Ms. Vira Danielson niece) is a carrier. For that reason, BRCA2 related risks and recommendations are summarized below. Ms. Vira Danielson results identified a pathogenic BRCA2 gene mutation which causes hereditary breast and ovarian cancer syndrome (HBOC). Mutations in the BRCA2 gene are associated with an increased risk for the development of certain cancers as outlined below. General Population BRCA2 Carriers    Female Breast  12% 50-85%   Ovarian   (includes fallopian tube and primary peritoneal cancer) 1.5% 11-18%   Prostate  14% 15-20%   Male Breast  <1% 6-8%   Pancreatic  <1%  Elevated   Melanoma <1% Elevated     Women who have already been diagnosed with breast cancer have an increased risk to develop a second primary breast cancer as high as 35% within 10 years of their initial diagnosis with no intervention. Individuals who carry two BRCA2 gene mutations are at risk for an autosomal recessive condition called Fanconi Anemia (FA). Parents who each carry a BRCA2 gene mutation have a 25% chance to have a child with FA in each pregnancy. Therefore, these risks should be discussed with BRCA2 carriers who are of reproductive age. Ms. Vira Danielson results also identified a pathogenic CHEK2 gene mutation which has not yet been identified in the family. The CHEK2 gene is also associated with an increased risk for breast cancer as well as colon cancer.       General Population CHEK2 Carriers    Female Breast  12% 11-40%   Colorectal Cancer reducing surgery. PROSTATE CANCER      NCCN Recommendation Age to Begin  Frequency   Prostate screening including PSA and digital rectal exam (LAURA) 40 years  1-4 years   based on results        MALE BREAST CANCER     NCCN Recommendation Age to Begin  Frequency    Breast awareness - Men should be familiar with their breasts and promptly report changes to their healthcare provider. Periodic, consistent breast self-examination (BSE) may be beneficial   35 years Periodic and consistent   Clinical Breast Exam 35 years  Yearly    Consider mammogram screening in men with gynecomastia (excessive breast tissue) 48years old or 10 years prior to the earliest diagnosis of male breast cancer in the family   Yearly       PANCREATIC CANCER  There is emerging data to suggest screening by contrast enhanced MRI/magnetic resonance cholangiopancreatography (MRCP) and/or endoscopic ultrasound (EUS) for individuals who are at high risk for pancreatic cancer. It is recommended that screening be performed in an experienced, high volume center, ideally under research conditions. It is also recommended that such screening take place after having an in-depth discussion regarding the potential limitations including: cost, high incidence of pancreatic abnormalities and uncertainties about the potential benefits of screening. For BRCA2 gene mutation carriers who have a family history of  ?1 first or second degree relatives with exocrine pancreatic cancer, screening may begin at age 48 (or 8 years earlier than the youngest diagnosis of pancreatic cancer in the family, whichever is earlier). MELANOMA   No specific screening guidelines exist for melanoma; however, annual full body skin examination is appropriate and minimizing UV exposure. OTHER CANCER     At this time, there is no clear evidence that suggests individuals with a BRCA2 gene mutation have an increased risk for other cancers.  Thus, individuals should complete an annual physician exam and follow general population screening guidelines for all other cancers at the direction of their physician. CANCER TREATMENT RECOMMENDATIONS   PARP (poly ADP-ribose polymerase) inhibitors may be considered for individuals with HER2-negative metastatic breast cancer, chemotherapy-refractory ovarian cancer, metastatic castration resistant prostate cancer and maintenance therapy for metastatic pancreatic cancer. REPRODUCTIVE RECOMMENDATIONS   Prenatal genetic counseling is recommended for individuals who are of reproductive age to discuss the risk of Fanconi Anemia for their offspring. If both parents have a BRCA2 mutation, each of their children have a 25% chance to have this condition. Individuals should be advised of options for prenatal diagnosis and pre-implantation genetic diagnosis. RECOMMENDATIONS - CHEK2  Individuals who carry a CHEK2 mutation are encouraged to follow the [address] [place] [address] (NCCN) Version 2.2022 guidelines for cancer screening and prevention as outlined below. FEMALE BREAST CANCER      NCCN Recommendation Age to Begin Frequency    Breast awareness - Women should be familiar with their breasts and promptly report changes to their healthcare provider. Periodic, consistent breast self-examination (BSE) may be beneficial  Individualized  N/A    Clinical Breast Examination  36years old* Every 6-12 months   Consider breast MRI with contrast  36years old*  Annual   Mammogram (consider tomosynthesis)  36years old*  Annual    Risk reducing mastectomy considered on an individual basis including personal and family history of cancer Individualized  N/A   Consider risk reducing agents, such as chemoprevention (i.e. Tamoxifen)  Individualized  N/A    *age to begin screening based on the ages of breast cancer diagnoses in Ms. Scott johnson.      COLON CANCER      NCCN Recommendation Age to Begin Frequency   Colonoscopy  36years old or earlier based on family history  Every 5 years    *age to begin screening based on the ages of colon cancer diagnoses in Ms. Poonam Conklin family. RECOMMENDATIONS FOR FAMILY MEMBERS   First degree relatives (parents, siblings, and children) of individuals who carry a mutation each have a 50% chance to inherit the familial mutation. 1) Therefore, we recommend that Ms. Poonam Conklin children and siblings consider genetic counseling and testing for both BRCA2 and CHEK2 gene mutations to clarify their carrier status. 2) Due to the family history of cancer on both her maternal and paternal side, it is difficult to determine which side each of the mutations were inherited from. Thus, we recommend that both her paternal and maternal extended relatives consider genetic counseling and testing until this can be clarified. 3) At risk relatives who have not undergone genetic testing are encouraged to follow the NCCN recommendations above until their carrier status is clarified. Relatives may contact the [email or web address] at [phone] or locate a genetic counselor at www. [email or web address]      SUMMARY & PLAN   1) Ms. Vini Kenney was found to carry the pathogenic [id number] mutation in the BRCA2 gene which causes an increased risk for several cancers including: female breast, ovarian, male breast, prostate, pancreatic and melanoma. 2) Ms. 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 3) Based on Ms. Poonam Conklin test result, her lifetime risk to develop breast cancer is increased. We recommend that she consider the NCCN screening and risk reducing options outlined above. She plans to discuss increased breast cancer surveillance including annual breast MRIs with her referring physician.  She declines immediate referral to surgery or oncology to discuss other risk reducing options. 4) Based on Ms. Murphy Keating test result, her lifetime risk to develop ovarian cancer is increased. She reports having already undergone hysterectomy and bilateral salpingo oophorectomy at age 63. She should continue gynecologic cancer screening as directed by her physicians. 5) Based on Ms. Murphy Keating test result, her lifetime risk for pancreatic cancer may be increased. At this time, pancreatic screening is only recommended for individuals who have one or more close relative with pancreatic cancer (on the same side of the family as the hereditary gene mutation). Benefits and limitations of pancreatic cancer screening are outlined above. She should discuss the option of annual MRI/MRCP and/or EUS with her physicians since we cannot confirm whether or not the BRCA2 mutation was inherited from her mother who had pancreatic cancer or her father in which there is no family history of pancreatic cancer. 6) Based on Ms. Murphy Keating test result, her lifetime risk for melanoma may be increased; however, the exact risk is unclear at this time. She may consider annual dermatologic evaluation and minimizing UV/sun exposure. 7) Based on Ms. Murphy Keating test result, her lifetime risk for colorectal cancer is increased. The NCCN recommends that she undergo colonoscopy screening at least every 5 years which she relates she was already doing due to her family history. 8) We encourage Ms. Brenda Rodriguez to share her genetic test results with her family members. Support and resources were given to Ms. Brenda Rodriguez, including:      Facing Our Risk of Cancer Empowered (FORCE) http://www.JuiceBoxJungle.com/   [address] Decision Making Model http://brcatool. [employer].edu/       9) We encourage Ms. Brenda Rodriguez to contact us with updates to her personal and/or family's cancer history as this information may alter our assessment and/or recommendations.       The [address] [place], [place] [address] would be glad to offer our assistance should you have any questions or concerns about this information. Please feel free to contact us at [phone]. Dontrell Hartman.  Aminah Reilly [address] 88, [facility]   Licensed Genetic Counselor       CC:   Jatin GROSS [facility], DO

## 2022-06-02 ENCOUNTER — HOSPITAL ENCOUNTER (OUTPATIENT)
Dept: MAMMOGRAPHY | Age: 67
Discharge: HOME | End: 2022-06-04
Payer: MEDICARE

## 2022-06-02 VITALS — WEIGHT: 217 LBS | BODY MASS INDEX: 37.05 KG/M2 | HEIGHT: 64 IN

## 2022-06-02 DIAGNOSIS — Z12.31 VISIT FOR SCREENING MAMMOGRAM: ICD-10-CM

## 2022-06-02 PROCEDURE — 77063 BREAST TOMOSYNTHESIS BI: CPT

## 2022-06-29 ENCOUNTER — OFFICE VISIT (OUTPATIENT)
Dept: FAMILY MEDICINE CLINIC | Age: 67
End: 2022-06-29
Payer: MEDICARE

## 2022-06-29 VITALS
OXYGEN SATURATION: 98 % | SYSTOLIC BLOOD PRESSURE: 120 MMHG | WEIGHT: 218 LBS | HEIGHT: 64 IN | HEART RATE: 76 BPM | BODY MASS INDEX: 37.22 KG/M2 | TEMPERATURE: 97.1 F | RESPIRATION RATE: 16 BRPM | DIASTOLIC BLOOD PRESSURE: 80 MMHG

## 2022-06-29 DIAGNOSIS — M79.10 MYALGIA: ICD-10-CM

## 2022-06-29 DIAGNOSIS — M54.6 ACUTE RIGHT-SIDED THORACIC BACK PAIN: Primary | ICD-10-CM

## 2022-06-29 PROCEDURE — PBSHW PBB SHADOW CHARGE: Performed by: FAMILY MEDICINE

## 2022-06-29 PROCEDURE — 99213 OFFICE O/P EST LOW 20 MIN: CPT | Performed by: FAMILY MEDICINE

## 2022-06-29 PROCEDURE — 99211 OFF/OP EST MAY X REQ PHY/QHP: CPT | Performed by: FAMILY MEDICINE

## 2022-06-29 PROCEDURE — 1123F ACP DISCUSS/DSCN MKR DOCD: CPT | Performed by: FAMILY MEDICINE

## 2022-06-29 PROCEDURE — 20552 NJX 1/MLT TRIGGER POINT 1/2: CPT | Performed by: FAMILY MEDICINE

## 2022-06-29 RX ORDER — TIZANIDINE 4 MG/1
4 TABLET ORAL 3 TIMES DAILY PRN
Qty: 30 TABLET | Refills: 0 | Status: SHIPPED | OUTPATIENT
Start: 2022-06-29 | End: 2022-07-28

## 2022-06-29 RX ORDER — TRIAMCINOLONE ACETONIDE 40 MG/ML
40 INJECTION, SUSPENSION INTRA-ARTICULAR; INTRAMUSCULAR ONCE
Status: COMPLETED | OUTPATIENT
Start: 2022-06-29 | End: 2022-06-29

## 2022-06-29 RX ADMIN — TRIAMCINOLONE ACETONIDE 40 MG: 40 INJECTION, SUSPENSION INTRA-ARTICULAR; INTRAMUSCULAR at 10:08

## 2022-06-29 ASSESSMENT — ENCOUNTER SYMPTOMS: BACK PAIN: 1

## 2022-06-29 NOTE — PROGRESS NOTES
2022     Otis Peters (:  1955) is a 73 y.o. female, here for evaluation of the following medical concerns:    Back Pain  This is a new problem. The current episode started in the past 7 days (started having right mid thoracic pain after lifting wood over the weekend). The problem occurs constantly. The problem is unchanged. The pain is present in the thoracic spine. The quality of the pain is described as aching (gets spasms in waves. ). The pain does not radiate. Pertinent negatives include no paresis, tingling or weakness. She has tried NSAIDs for the symptoms. The treatment provided no relief. Did review patient's med list, allergies, social history,pmhx and pshx today as noted in the record. Review of Systems   Musculoskeletal: Positive for back pain and myalgias. Negative for arthralgias. Neurological: Negative for tingling and weakness. Prior to Visit Medications    Medication Sig Taking? Authorizing Provider   clobetasol (TEMOVATE) 0.05 % cream Apply topically 2 times daily to affected area. Chip Dick DO   omeprazole (PRILOSEC) 20 MG delayed release capsule TAKE 1 CAPSULE BY MOUTH ONE TIME A DAY  Sebastian Starks MD   loratadine (CLARITIN) 10 MG tablet Take 10 mg by mouth daily   Historical Provider, MD   ibuprofen (ADVIL;MOTRIN) 600 MG tablet Take 600 mg by mouth every 8 hours as needed for Pain. Historical Provider, MD   Multiple Vitamins-Minerals (MULTIVITAMIN PO) Take 1 tablet by mouth daily. Historical Provider, MD        Social History     Tobacco Use    Smoking status: Never Smoker    Smokeless tobacco: Never Used   Substance Use Topics    Alcohol use: No        There were no vitals filed for this visit. Estimated body mass index is 37.25 kg/m² as calculated from the following:    Height as of 22: 5' 4\" (1.626 m). Weight as of 22: 217 lb (98.4 kg). Physical Exam  Vitals and nursing note reviewed.    Constitutional:       General: She is not in acute distress. Appearance: She is well-developed. She is not diaphoretic. HENT:      Head: Normocephalic and atraumatic. Eyes:      Conjunctiva/sclera: Conjunctivae normal.   Pulmonary:      Effort: Pulmonary effort is normal.   Musculoskeletal:         General: Tenderness present. No swelling. Cervical back: Normal range of motion. Comments: Right mid thoracic paravertebral musculature with increased spasm and tenderness with palpation. Skin:     General: Skin is warm and dry. Coloration: Skin is not pale. Findings: No erythema or rash. Neurological:      Mental Status: She is alert and oriented to person, place, and time. Psychiatric:         Behavior: Behavior normal.         Thought Content: Thought content normal.         Judgment: Judgment normal.         ASSESSMENT/PLAN:  Encounter Diagnoses   Name Primary?  Acute right-sided thoracic back pain Yes    Myalgia      Orders Placed This Encounter   Medications    tiZANidine (ZANAFLEX) 4 MG tablet     Sig: Take 1 tablet by mouth 3 times daily as needed (muscle spasm)     Dispense:  30 tablet     Refill:  0    triamcinolone acetonide (KENALOG-40) injection 40 mg     Orders Placed This Encounter   Procedures    ND INJECT TRIGGER POINT, 1 OR 2       Trigger Point Injection Procedure Note  Indication: Severe pain and pain control    Procedure: The patient was placed in the appropriate position and the area over the trigger point was prepped with alcohol. Injection was performed into the trigger point area using 1 cc of Kenalog (triamcinalone 40mg/ml), mixed with 2 cc of 1% Lidocaine without epinephrine. The injection site was covered with a bandage. The patient tolerated the procedure well. Complications: None    Did give muscle relaxer to use as needed for muscle spasms. Can use heat to the area as needed.     Tylenol/Motrin prn    Return  if no improvement in symptoms or if any further symptoms arise.            No follow-ups on file. An electronic signature was used to authenticate this note.     --Fatimah Stein, DO on 6/29/2022 at 7:26 AM

## 2022-07-28 ENCOUNTER — OFFICE VISIT (OUTPATIENT)
Dept: FAMILY MEDICINE CLINIC | Age: 67
End: 2022-07-28
Payer: MEDICARE

## 2022-07-28 VITALS
BODY MASS INDEX: 36.7 KG/M2 | HEIGHT: 64 IN | DIASTOLIC BLOOD PRESSURE: 86 MMHG | TEMPERATURE: 97.2 F | HEART RATE: 72 BPM | SYSTOLIC BLOOD PRESSURE: 114 MMHG | WEIGHT: 215 LBS | OXYGEN SATURATION: 98 % | RESPIRATION RATE: 16 BRPM

## 2022-07-28 DIAGNOSIS — N64.89 OTHER SPECIFIED DISORDERS OF BREAST: ICD-10-CM

## 2022-07-28 DIAGNOSIS — Z15.09 BRCA2 POSITIVE: Primary | ICD-10-CM

## 2022-07-28 DIAGNOSIS — Z12.39 BREAST CANCER SCREENING, HIGH RISK PATIENT: ICD-10-CM

## 2022-07-28 DIAGNOSIS — Z15.01 BRCA2 POSITIVE: Primary | ICD-10-CM

## 2022-07-28 PROCEDURE — 1123F ACP DISCUSS/DSCN MKR DOCD: CPT | Performed by: FAMILY MEDICINE

## 2022-07-28 PROCEDURE — 99214 OFFICE O/P EST MOD 30 MIN: CPT | Performed by: FAMILY MEDICINE

## 2022-07-28 PROCEDURE — 99213 OFFICE O/P EST LOW 20 MIN: CPT

## 2022-07-28 ASSESSMENT — ENCOUNTER SYMPTOMS
RESPIRATORY NEGATIVE: 1
EYES NEGATIVE: 1

## 2022-07-28 NOTE — PROGRESS NOTES
2022     Lenny Lopez (:  1955) is a 73 y.o. female, here for evaluation of the following medical concerns:    HPI  Patient is seen today to discuss her genetic testing report and recommendations for screening. Patient recently had genetic testing and was found to have the BRCA2 gene. Her sister has had known history of breast cancer and her mother did have pancreatic cancer. She does state that her brother had prostate cancer and had another brother with skin cancer but does not feel this was melanoma. Her father did have colon cancer at the age of 80. Has had multiple aunts and 1 paternal first cousin with breast cancer. We did talk about her testing report. With her known history of the BRCA2 gene it does put her at higher risk for breast cancer. Options would be to consider a prophylactic mastectomy versus doing more frequent screenings with mammogram alternating with breast MRI. Risks and benefits of both are discussed and ultimately she seems agreeable to pursuing more frequent imaging. We will place an order for her breast MRI and she is going to check with her insurance to make sure this is going to be a covered benefit. Also she is to have colon cancer screenings routinely and she just had one in  so it was advised at that time to repeat in 3 years. Due to the increased risk of pancreatic cancer we can certainly do a pancreatic ultrasound. Other options would be to do an MRCP but patient seems comfortable starting with ultrasound evaluation. Patient has underwent a VH with BSO in . Also discussed melanoma screenings and doing routine skin checks to check for any abnormal skin lesions that she does express understanding of this. Patient otherwise today has no other acute medical concerns at this time. Did review patient's med list, allergies, social history, fam history, pmhx and pshx today as noted in the record.         Review of Systems   Constitutional: Negative. HENT: Negative. Eyes: Negative. Respiratory: Negative. Cardiovascular: Negative. Prior to Visit Medications    Medication Sig Taking? Authorizing Provider   tiZANidine (ZANAFLEX) 4 MG tablet Take 1 tablet by mouth 3 times daily as needed (muscle spasm)  Haven Dayana, DO   clobetasol (TEMOVATE) 0.05 % cream Apply topically 2 times daily to affected area. Haven Dayana, DO   omeprazole (PRILOSEC) 20 MG delayed release capsule TAKE 1 CAPSULE BY MOUTH ONE TIME A DAY  Lan Jolley MD   loratadine (CLARITIN) 10 MG tablet Take 10 mg by mouth daily   Historical Provider, MD   ibuprofen (ADVIL;MOTRIN) 600 MG tablet Take 600 mg by mouth every 8 hours as needed for Pain. Historical Provider, MD   Multiple Vitamins-Minerals (MULTIVITAMIN PO) Take 1 tablet by mouth daily. Historical Provider, MD        Social History     Tobacco Use    Smoking status: Never    Smokeless tobacco: Never   Substance Use Topics    Alcohol use: No        There were no vitals filed for this visit. Estimated body mass index is 37.42 kg/m² as calculated from the following:    Height as of 6/29/22: 5' 4\" (1.626 m). Weight as of 6/29/22: 218 lb (98.9 kg). Physical Exam  Vitals and nursing note reviewed. Constitutional:       General: She is not in acute distress. Appearance: She is well-developed. She is not diaphoretic. HENT:      Head: Normocephalic and atraumatic. Eyes:      Conjunctiva/sclera: Conjunctivae normal.   Pulmonary:      Effort: Pulmonary effort is normal.   Musculoskeletal:      Cervical back: Normal range of motion. Skin:     General: Skin is warm and dry. Coloration: Skin is not pale. Findings: No erythema or rash. Neurological:      Mental Status: She is alert and oriented to person, place, and time. Psychiatric:         Behavior: Behavior normal.         Thought Content:  Thought content normal.         Judgment: Judgment normal.       ASSESSMENT/PLAN:  Encounter Diagnoses   Name Primary? BRCA2 positive Yes    Breast cancer screening, high risk patient     Other specified disorders of breast       No orders of the defined types were placed in this encounter. Orders Placed This Encounter   Procedures    US PANCREAS           Standing Status:   Future     Standing Expiration Date:   7/28/2023    MRI BREAST BILATERAL W WO CONTRAST     Standing Status:   Future     Standing Expiration Date:   7/28/2023     Order Specific Question:   STAT Creatinine as needed:     Answer:   Yes     Order Specific Question:   Reason for exam:     Answer:   screening in high risk patient. BRCA 2 positive     Order Specific Question:   What is the sedation requirement? Answer:   None     Had lengthy discussion today with patient regarding her BRCA2 testing and to discuss screening recommendations. Did order pancreatic ultrasound as well as an MRI of bilateral breast to be done in December. Should have annual mammograms. Should have routine colonoscopies and next one will be due in 2024. Also did recommend routine self breast examinations monthly. Did recommend skin evaluation for any abnormal skin lesions that could be concerning for melanoma. Patient is to return to my office as scheduled for her routine general medical follow up visit or sooner if any further problems or symptoms arise. (Please note that portions of this note were completed with a voice recognition program. Efforts were made to edit the dictations but occasionally words are mis-transcribed.)    Total time spent face to face with patient in the office discussing medical issues, reviewing test reports and discussing treatment options was 25 minutes  and greater than 50 % of my time was spent counseling patient. No follow-ups on file. An electronic signature was used to authenticate this note.     --Rufus Miller, DO on 7/28/2022 at 7:08 AM

## 2022-08-18 ENCOUNTER — PROCEDURE VISIT (OUTPATIENT)
Dept: SURGERY | Age: 67
End: 2022-08-18
Payer: MEDICARE

## 2022-08-18 ENCOUNTER — HOSPITAL ENCOUNTER (OUTPATIENT)
Age: 67
Setting detail: SPECIMEN
Discharge: HOME | End: 2022-08-18
Payer: MEDICARE

## 2022-08-18 VITALS
HEIGHT: 64 IN | WEIGHT: 215 LBS | BODY MASS INDEX: 36.7 KG/M2 | DIASTOLIC BLOOD PRESSURE: 80 MMHG | HEART RATE: 80 BPM | SYSTOLIC BLOOD PRESSURE: 124 MMHG | TEMPERATURE: 98.4 F

## 2022-08-18 DIAGNOSIS — L98.9 SKIN LESION OF NECK: Primary | ICD-10-CM

## 2022-08-18 DIAGNOSIS — L98.9 SKIN LESION OF NECK: ICD-10-CM

## 2022-08-18 DIAGNOSIS — D23.5 OTHER BENIGN NEOPLASM OF SKIN OF TRUNK: ICD-10-CM

## 2022-08-18 PROCEDURE — 99999 PR OFFICE/OUTPT VISIT,PROCEDURE ONLY: CPT | Performed by: SURGERY

## 2022-08-18 PROCEDURE — 11404 EXC TR-EXT B9+MARG 3.1-4 CM: CPT | Performed by: SURGERY

## 2022-08-18 PROCEDURE — 88305 TISSUE EXAM BY PATHOLOGIST: CPT

## 2022-08-18 PROCEDURE — 11200 RMVL SKIN TAGS UP TO&INC 15: CPT | Performed by: SURGERY

## 2022-08-18 NOTE — PATIENT INSTRUCTIONS
SIGNS OF INFECTION  - Redness, swelling, skin hot  - Wound bed turns black or stringy yellow  - Foul odor  - Increased drainage or pus  - Increased pain  - Fever greater than 100F    CALL YOUR DOCTOR OR SEEK MEDICAL ATTENTION IF SIGNS OF INFECTION.   DO NOT WAIT UNTIL YOUR NEXT APPOINTMENT    Call the Wound Care Nurse with any other questions or concerns- [phone]

## 2022-08-22 LAB — DERMATOLOGY PATHOLOGY REPORT: NORMAL

## 2022-10-13 ENCOUNTER — PATIENT MESSAGE (OUTPATIENT)
Dept: FAMILY MEDICINE CLINIC | Age: 67
End: 2022-10-13

## 2022-10-18 NOTE — TELEPHONE ENCOUNTER
From: Alpa Boland  To: Dr. Zepeda Earthly: 10/13/2022 12:38 PM EDT  Subject: Papito Dan or Christina Later    I noticed I have another small boil type pimple on my belly. Is there anything I can do about that? It doesn't hurt, just annoying. I keep it clean and dry. It doesn't need drained or anything but just wondered if there is some medication I can use.  THanks  Chris & Elise

## 2022-11-11 ENCOUNTER — OFFICE VISIT (OUTPATIENT)
Dept: FAMILY MEDICINE CLINIC | Age: 67
End: 2022-11-11
Payer: MEDICARE

## 2022-11-11 VITALS
BODY MASS INDEX: 37.9 KG/M2 | HEIGHT: 64 IN | DIASTOLIC BLOOD PRESSURE: 80 MMHG | TEMPERATURE: 97 F | RESPIRATION RATE: 16 BRPM | OXYGEN SATURATION: 93 % | WEIGHT: 222 LBS | HEART RATE: 88 BPM | SYSTOLIC BLOOD PRESSURE: 124 MMHG

## 2022-11-11 DIAGNOSIS — B37.2 CANDIDAL DERMATITIS: ICD-10-CM

## 2022-11-11 DIAGNOSIS — L73.9 FOLLICULITIS: Primary | ICD-10-CM

## 2022-11-11 PROCEDURE — 1123F ACP DISCUSS/DSCN MKR DOCD: CPT | Performed by: FAMILY MEDICINE

## 2022-11-11 PROCEDURE — 99212 OFFICE O/P EST SF 10 MIN: CPT | Performed by: FAMILY MEDICINE

## 2022-11-11 PROCEDURE — 99213 OFFICE O/P EST LOW 20 MIN: CPT | Performed by: FAMILY MEDICINE

## 2022-11-11 RX ORDER — NYSTATIN 100000 [USP'U]/G
POWDER TOPICAL
Qty: 60 G | Refills: 5 | Status: SHIPPED | OUTPATIENT
Start: 2022-11-11

## 2022-11-11 RX ORDER — DOXYCYCLINE HYCLATE 100 MG
100 TABLET ORAL 2 TIMES DAILY
Qty: 20 TABLET | Refills: 0 | Status: SHIPPED | OUTPATIENT
Start: 2022-11-11

## 2022-11-11 SDOH — ECONOMIC STABILITY: FOOD INSECURITY: WITHIN THE PAST 12 MONTHS, THE FOOD YOU BOUGHT JUST DIDN'T LAST AND YOU DIDN'T HAVE MONEY TO GET MORE.: NEVER TRUE

## 2022-11-11 SDOH — ECONOMIC STABILITY: FOOD INSECURITY: WITHIN THE PAST 12 MONTHS, YOU WORRIED THAT YOUR FOOD WOULD RUN OUT BEFORE YOU GOT MONEY TO BUY MORE.: NEVER TRUE

## 2022-11-11 ASSESSMENT — SOCIAL DETERMINANTS OF HEALTH (SDOH): HOW HARD IS IT FOR YOU TO PAY FOR THE VERY BASICS LIKE FOOD, HOUSING, MEDICAL CARE, AND HEATING?: NOT HARD AT ALL

## 2022-11-22 ASSESSMENT — ENCOUNTER SYMPTOMS: COLOR CHANGE: 1

## 2022-11-22 NOTE — PROGRESS NOTES
2022     Teresa Aguilera (:  1955) is a 73 y.o. female, here for evaluation of the following medical concerns:    HPI  Patient comes in today secondary to a lesion under her right breast.  Patient states that she often gets irritation under the breast tissue that causes some erythema of the skin surface. She did recently however have a small raised tender area that developed that she thought might of been a small boil or infected hair follicle. Had been putting on some topical antibiotic ointment but it just was not clearing. Comes in today for further assessment. Otherwise today no other acute medical concerns. Did review patient's med list, allergies, social history, fam history, pmhx and pshx today as noted in the record. Review of Systems   Constitutional:  Negative for chills, fatigue and fever. Skin:  Positive for color change, rash and wound. Prior to Visit Medications    Medication Sig Taking? Authorizing Provider   doxycycline hyclate (VIBRA-TABS) 100 MG tablet Take 1 tablet by mouth 2 times daily Yes Emma Gerber, DO   nystatin (MYCOSTATIN) 740810 UNIT/GM powder Apply topically 4 times daily to affected area. Yes Emma Gerber, DO   omeprazole (PRILOSEC) 20 MG delayed release capsule TAKE 1 CAPSULE BY MOUTH ONE TIME A DAY Yes Uli Yu MD   Multiple Vitamins-Minerals (MULTIVITAMIN PO) Take 1 tablet by mouth daily. Yes Historical Provider, MD   ibuprofen (ADVIL;MOTRIN) 600 MG tablet Take 600 mg by mouth every 8 hours as needed for Pain.   Patient not taking: Reported on 2022  Historical Provider, MD        Social History     Tobacco Use    Smoking status: Never    Smokeless tobacco: Never   Substance Use Topics    Alcohol use: No        Vitals:    22 1503   BP: 124/80   Site: Left Upper Arm   Position: Sitting   Cuff Size: Large Adult   Pulse: 88   Resp: 16   Temp: 97 °F (36.1 °C)   TempSrc: Tympanic   SpO2: 93%   Weight: 222 lb (100.7 kg)   Height: 5' 4\" (1.626 m)     Estimated body mass index is 38.11 kg/m² as calculated from the following:    Height as of this encounter: 5' 4\" (1.626 m). Weight as of this encounter: 222 lb (100.7 kg). Physical Exam  Vitals and nursing note reviewed. Constitutional:       General: She is not in acute distress. Appearance: She is well-developed. She is not diaphoretic. HENT:      Head: Normocephalic and atraumatic. Eyes:      Conjunctiva/sclera: Conjunctivae normal.   Pulmonary:      Effort: Pulmonary effort is normal.   Musculoskeletal:      Cervical back: Normal range of motion. Skin:     General: Skin is warm and dry. Coloration: Skin is not pale. Findings: Erythema and rash present. Comments: Small cystic pustular lesion to the right lower breast on the chest wall. This is slightly erythematous and mildly tender with palpation. Has some mild erythema to the skin surface under the breast tissue bilaterally. This is consistent with a mild Candida infection. Neurological:      Mental Status: She is alert and oriented to person, place, and time. Psychiatric:         Behavior: Behavior normal.         Thought Content: Thought content normal.         Judgment: Judgment normal.       ASSESSMENT/PLAN:    Encounter Diagnoses   Name Primary? Folliculitis Yes    Candidal dermatitis      Orders Placed This Encounter   Medications    doxycycline hyclate (VIBRA-TABS) 100 MG tablet     Sig: Take 1 tablet by mouth 2 times daily     Dispense:  20 tablet     Refill:  0    nystatin (MYCOSTATIN) 344284 UNIT/GM powder     Sig: Apply topically 4 times daily to affected area. Dispense:  60 g     Refill:  5     Did give doxycycline to clear the small pustular follicular lesion. Patient is given nystatin powder to use to the area to prevent and clear any candidal infection. Patient is to return to my office if she has persistent issues or no improvement in her acute symptomatology.     (Please note that portions of this note were completed with a voice recognition program. Efforts were made to edit the dictations but occasionally words are mis-transcribed.)    No follow-ups on file. An electronic signature was used to authenticate this note.     --Franco Shaver, DO on 11/22/2022 at 7:44 AM

## 2022-11-27 RX ORDER — OMEPRAZOLE 20 MG/1
CAPSULE, DELAYED RELEASE ORAL
Qty: 90 CAPSULE | Refills: 3 | Status: SHIPPED | OUTPATIENT
Start: 2022-11-27

## 2022-11-28 ENCOUNTER — TELEPHONE (OUTPATIENT)
Dept: FAMILY MEDICINE CLINIC | Age: 67
End: 2022-11-28

## 2022-11-28 DIAGNOSIS — R73.01 IMPAIRED FASTING GLUCOSE: Primary | ICD-10-CM

## 2022-11-28 RX ORDER — OMEPRAZOLE 20 MG/1
CAPSULE, DELAYED RELEASE ORAL
Qty: 90 CAPSULE | Refills: 3 | Status: CANCELLED | OUTPATIENT
Start: 2022-11-28

## 2022-11-28 NOTE — TELEPHONE ENCOUNTER
Spoke with pharmacy. Script was sent in by Dr Babs Bumpers yesterday. They have active script on file.  Will disregard this request.    Boston Cogan called requesting a refill of the below medication which has been pended for you:     Requested Prescriptions     Pending Prescriptions Disp Refills    omeprazole (PRILOSEC) 20 MG delayed release capsule 90 capsule 3       Last Appointment Date: 11/11/2022  Next Appointment Date: 5/17/2023    Allergies   Allergen Reactions    Valtrex [Valacyclovir] Hives    Adhesive Tape     Bactrim [Sulfamethoxazole-Trimethoprim] Rash

## 2022-11-28 NOTE — TELEPHONE ENCOUNTER
Corie from scheduling calling stating pt is scheduled for a breat MRI on Jan 12, but pt will need a creatinine ordered.

## 2022-12-19 RX ORDER — DOXYCYCLINE HYCLATE 100 MG
TABLET ORAL
Qty: 20 TABLET | Refills: 0 | Status: SHIPPED | OUTPATIENT
Start: 2022-12-19

## 2022-12-19 NOTE — TELEPHONE ENCOUNTER
[employer] Mary called requesting a refill of the below medication which has been pended for you:     Spoke to patient and she states she still has a few areas on her belly that are clearing up, but not completely.  She wanted to know about taking another round of doxycyline to help clear them up    (Started on 43/[birth year]- folliculitis)       Requested Prescriptions     Pending Prescriptions Disp Refills    doxycycline hyclate (VIBRA-TABS) 100 MG tablet [Pharmacy Med Name: DOXYCYCLINE HYCLATE 100MG TABS] 20 tablet 0     Sig: TAKE 1 TABLET BY MOUTH 2 TIMES A DAY       Last Appointment Date: 11/11/2022  Next Appointment Date: 5/17/2023    Allergies   Allergen Reactions    Valtrex [Valacyclovir] Hives    Adhesive Tape     Bactrim [Sulfamethoxazole-Trimethoprim] Rash

## 2022-12-27 ENCOUNTER — TELEMEDICINE (OUTPATIENT)
Dept: FAMILY MEDICINE CLINIC | Age: 67
End: 2022-12-27
Payer: MEDICARE

## 2022-12-27 DIAGNOSIS — Z00.00 INITIAL MEDICARE ANNUAL WELLNESS VISIT: Primary | ICD-10-CM

## 2022-12-27 PROCEDURE — G0438 PPPS, INITIAL VISIT: HCPCS | Performed by: FAMILY MEDICINE

## 2022-12-27 PROCEDURE — 1123F ACP DISCUSS/DSCN MKR DOCD: CPT | Performed by: FAMILY MEDICINE

## 2022-12-27 ASSESSMENT — PATIENT HEALTH QUESTIONNAIRE - PHQ9
SUM OF ALL RESPONSES TO PHQ QUESTIONS 1-9: 0
1. LITTLE INTEREST OR PLEASURE IN DOING THINGS: 0
SUM OF ALL RESPONSES TO PHQ QUESTIONS 1-9: 0
SUM OF ALL RESPONSES TO PHQ QUESTIONS 1-9: 0
2. FEELING DOWN, DEPRESSED OR HOPELESS: 0
SUM OF ALL RESPONSES TO PHQ9 QUESTIONS 1 & 2: 0
SUM OF ALL RESPONSES TO PHQ QUESTIONS 1-9: 0

## 2022-12-27 ASSESSMENT — LIFESTYLE VARIABLES
HOW OFTEN DO YOU HAVE A DRINK CONTAINING ALCOHOL: MONTHLY OR LESS
HOW MANY STANDARD DRINKS CONTAINING ALCOHOL DO YOU HAVE ON A TYPICAL DAY: 1 OR 2

## 2022-12-27 NOTE — PROGRESS NOTES
Medicare Annual Wellness Visit    Radha Reyes is here for Medicare AWV (initial)    Assessment & Plan    Recommendations for Preventive Services Due: see orders and patient instructions/AVS.    Patient declines dexa screening at this time. Recommended screening schedule for the next 5-10 years is provided to the patient in written form: see Patient Instructions/AVS.     No follow-ups on file. Subjective       Patient's complete Health Risk Assessment and screening values have been reviewed and are found in Flowsheets. The following problems were reviewed today and where indicated follow up appointments were made and/or referrals ordered. Positive Risk Factor Screenings with Interventions:                 Weight and Activity:  Physical Activity: Sufficiently Active    Days of Exercise per Week: 6 days    Minutes of Exercise per Session: 30 min     On average, how many days per week do you engage in moderate to strenuous exercise (like a brisk walk)?: 6 days  Have you lost any weight without trying in the past 3 months?: No       Obesity Interventions:  See AVS for additional education material            Vision Screen:  Do you have difficulty driving, watching TV, or doing any of your daily activities because of your eyesight?: No  Have you had an eye exam within the past year?: (!) No  No results found. Interventions:   Patient encouraged to make appointment with their eye specialist                Objective      Patient-Reported Vitals  Patient-Reported Weight: 219lb  Patient-Reported Height: 5'4\"            Allergies   Allergen Reactions    Valtrex [Valacyclovir] Hives    Adhesive Tape     Bactrim [Sulfamethoxazole-Trimethoprim] Rash     Prior to Visit Medications    Medication Sig Taking?  Authorizing Provider   doxycycline hyclate (VIBRA-TABS) 100 MG tablet TAKE 1 TABLET BY MOUTH 2 TIMES A DAY Yes Charissa Beck DO   omeprazole (PRILOSEC) 20 MG delayed release capsule TAKE 1 CAPSULE BY MOUTH Una Nett Yes Crow Weems MD   nystatin (MYCOSTATIN) 347428 UNIT/GM powder Apply topically 4 times daily to affected area. Yes Doris Garcia DO   Multiple Vitamins-Minerals (MULTIVITAMIN PO) Take 1 tablet by mouth daily. Yes Historical Provider, MD   ibuprofen (ADVIL;MOTRIN) 600 MG tablet Take 600 mg by mouth every 8 hours as needed for Pain. Patient not taking: No sig reported  Historical Provider, MD Vazquez (Including outside providers/suppliers regularly involved in providing care):   Patient Care Team:  Doris Garcia DO as PCP - General (Family Medicine)  Doris Garcia DO as PCP - [facility] Empaneled Provider     Reviewed and updated this visit:  Tobacco  Allergies  Meds  Med Hx  Surg Hx  Soc Hx  Fam Hx        Flavio Robles RN, 12/27/2022, performed the documented evaluation under the direct supervision of the attending physician. Indiana Herzog, was evaluated through a synchronous (real-time) audio encounter. The patient (or guardian if applicable) is aware that this is a billable service, which includes applicable co-pays. This Virtual Visit was conducted with patient's (and/or legal guardian's) consent. The visit was conducted pursuant to the emergency declaration under the [facility][note 3][facility], [address] [place] [address] authority and the [employer] and [employer] General Act. Patient identification was verified, and a caregiver was present when appropriate. The patient was located at Home: [address] [facility] [facility] [place]. Provider was located at [facility] ([address] [place] [address]): [address] [place] [address].

## 2022-12-27 NOTE — PATIENT INSTRUCTIONS
Personalized Preventive Plan for Fredi Mercer - 12/27/2022  Medicare offers a range of preventive health benefits. Some of the tests and screenings are paid in full while other may be subject to a deductible, co-insurance, and/or copay. Some of these benefits include a comprehensive review of your medical history including lifestyle, illnesses that may run in your family, and various assessments and screenings as appropriate. After reviewing your medical record and screening and assessments performed today your provider may have ordered immunizations, labs, imaging, and/or referrals for you. A list of these orders (if applicable) as well as your Preventive Care list are included within your After Visit Summary for your review. Other Preventive Recommendations:    A preventive eye exam performed by an eye specialist is recommended every 1-2 years to screen for glaucoma; cataracts, macular degeneration, and other eye disorders. A preventive dental visit is recommended every 6 months. Try to get at least 150 minutes of exercise per week or 10,000 steps per day on a pedometer . Order or download the FREE \"Exercise & Physical Activity: Your Everyday Guide\" from [employer]. Call 1-843.835.4081 or search [employer] online. You need 1010-7343 mg of calcium and 5275-0796 IU of vitamin D per day. It is possible to meet your calcium requirement with diet alone, but a vitamin D supplement is usually necessary to meet this goal.  When exposed to the sun, use a sunscreen that protects against both UVA and UVB radiation with an SPF of 30 or greater. Reapply every 2 to 3 hours or after sweating, drying off with a towel, or swimming. Always wear a seat belt when traveling in a car. Always wear a helmet when riding a bicycle or motorcycle. Heart-Healthy Diet   Sodium, Fat, and Cholesterol Controlled Diet       What Is a Heart Healthy Diet?    A heart-healthy diet is one that limits sodium , certain types of fat , and cholesterol . This type of diet is recommended for:   People with any form of cardiovascular disease (eg, coronary heart disease , peripheral vascular disease , previous heart attack , previous stroke )   People with risk factors for cardiovascular disease, such as high blood pressure , high cholesterol , or diabetes   Anyone who wants to lower their risk of developing cardiovascular disease   Sodium    Sodium is a mineral found in many foods. In general, most people consume much more sodium than they need. Diets high in sodium can increase blood pressure and lead to edema (water retention). On a heart-healthy diet, you should consume no more than 2,300 mg (milligrams) of sodium per dayabout the amount in one teaspoon of table salt. The foods highest in sodium include table salt (about 50% sodium), processed foods, convenience foods, and preserved foods. Cholesterol    Cholesterol is a fat-like, waxy substance in your blood. Our bodies make some cholesterol. It is also found in animal products, with the highest amounts in fatty meat, egg yolks, whole milk, cheese, shellfish, and organ meats. On a heart-healthy diet, you should limit your cholesterol intake to less than 200 mg per day. It is normal and important to have some cholesterol in your bloodstream. But too much cholesterol can cause plaque to build up within your arteries, which can eventually lead to a heart attack or stroke. The two types of cholesterol that are most commonly referred to are:   Low-density lipoprotein (LDL) cholesterol  Also known as bad cholesterol, this is the cholesterol that tends to build up along your arteries. Bad cholesterol levels are increased by eating fats that are saturated or hydrogenated. Optimal level of this cholesterol is less than 100. Over 130 starts to get risky for heart disease.    High-density lipoprotein (HDL) cholesterol  Also known as good cholesterol, this type of cholesterol actually carries cholesterol away from your arteries and may, therefore, help lower your risk of having a heart attack. You want this level to be high (ideally greater than 60). It is a risk to have a level less than 40. You can raise this good cholesterol by eating olive oil, canola oil, avocados, or nuts. Exercise raises this level, too. Fat    Fat is calorie dense and packs a lot of calories into a small amount of food. Even though fats should be limited due to their high calorie content, not all fats are bad. In fact, some fats are quite healthful. Fat can be broken down into four main types. The good-for-you fats are:   Monounsaturated fat  found in oils such as olive and canola, avocados, and nuts and natural nut butters; can decrease cholesterol levels, while keeping levels of HDL cholesterol high   Polyunsaturated fat  found in oils such as safflower, sunflower, soybean, corn, and sesame; can decrease total cholesterol and LDL cholesterol   Omega-3 fatty acids  particularly those found in fatty fish (such as salmon, trout, tuna, mackerel, herring, and sardines); can decrease risk of arrhythmias, decrease triglyceride levels, and slightly lower blood pressure   The fats that you want to limit are:   Saturated fat  found in animal products, many fast foods, and a few vegetables; increases total blood cholesterol, including LDL levels   Animal fats that are saturated include: butter, lard, whole-milk dairy products, meat fat, and poultry skin   Vegetable fats that are saturated include: hydrogenated shortening, palm oil, coconut oil, cocoa butter   Hydrogenated or trans fat  found in margarine and vegetable shortening, most shelf stable snack foods, and fried foods; increases LDL and decreases HDL     It is generally recommended that you limit your total fat for the day to less than 30% of your total calories.  If you follow an 1800-calorie heart healthy diet, for example, this would mean 60 grams of fat or less per day. Saturated fat and trans fat in your diet raises your blood cholesterol the most, much more than dietary cholesterol does. For this reason, on a heart-healthy diet, less than 7% of your calories should come from saturated fat and ideally 0% from trans fat. On an 1800-calorie diet, this translates into less than 14 grams of saturated fat per day, leaving 46 grams of fat to come from mono- and polyunsaturated fats.    Food Choices on a Heart Healthy Diet   Food Category   Foods Recommended   Foods to Avoid   Grains   Breads and rolls without salted tops Most dry and cooked cereals Unsalted crackers and breadsticks Low-sodium or homemade breadcrumbs or stuffing All rice and pastas   Breads, rolls, and crackers with salted tops High-fat baked goods (eg, muffins, donuts, pastries) Quick breads, self-rising flour, and biscuit mixes Regular bread crumbs Instant hot cereals Commercially prepared rice, pasta, or stuffing mixes   Vegetables   Most fresh, frozen, and low-sodium canned vegetables Low-sodium and salt-free vegetable juices Canned vegetables if unsalted or rinsed   Regular canned vegetables and juices, including sauerkraut and pickled vegetables Frozen vegetables with sauces Commercially prepared potato and vegetable mixes   Fruits   Most fresh, frozen, and canned fruits All fruit juices   Fruits processed with salt or sodium   Milk   Nonfat or low-fat (1%) milk Nonfat or low-fat yogurt Cottage cheese, low-fat ricotta, cheeses labeled as low-fat and low-sodium   Whole milk Reduced-fat (2%) milk Malted and chocolate milk Full fat yogurt Most cheeses (unless low-fat and low salt) Buttermilk (no more than 1 cup per week)   Meats and Beans   Lean cuts of fresh or frozen beef, veal, lamb, or pork (look for the word loin) Fresh or frozen poultry without the skin Fresh or frozen fish and some shellfish Egg whites and egg substitutes (Limit whole eggs to three per week) Tofu Nuts or seeds (unsalted, dry-roasted), low-sodium peanut butter Dried peas, beans, and lentils   Any smoked, cured, salted, or canned meat, fish, or poultry (including erazo, chipped beef, cold cuts, hot dogs, sausages, sardines, and anchovies) Poultry skins Breaded and/or fried fish or meats Canned peas, beans, and lentils Salted nuts   Fats and Oils   Olive oil and canola oil Low-sodium, low-fat salad dressings and mayonnaise   Butter, margarine, coconut and palm oils, erazo fat   Snacks, Sweets, and Condiments   Low-sodium or unsalted versions of broths, soups, soy sauce, and condiments Pepper, herbs, and spices; vinegar, lemon, or lime juice Low-fat frozen desserts (yogurt, sherbet, fruit bars) Sugar, cocoa powder, honey, syrup, jam, and preserves Low-fat, trans-fat free cookies, cakes, and pies Sheldon and animal crackers, fig bars, michael snaps   High-fat desserts Broth, soups, gravies, and sauces, made from instant mixes or other high-sodium ingredients Salted snack foods Canned olives Meat tenderizers, seasoning salt, and most flavored vinegars   Beverages   Low-sodium carbonated beverages Tea and coffee in moderation Soy milk   Commercially softened water   Suggestions   Make whole grains, fruits, and vegetables the base of your diet. Choose heart-healthy fats such as canola, olive, and flaxseed oil, and foods high in heart-healthy fats, such as nuts, seeds, soybeans, tofu, and fish. Eat fish at least twice per week; the fish highest in omega-3 fatty acids and lowest in mercury include salmon, herring, mackerel, sardines, and canned chunk light tuna. If you eat fish less than twice per week or have high triglycerides, talk to your doctor about taking fish oil supplements. Read food labels. For products low in fat and cholesterol, look for fat free, low-fat, cholesterol free, saturated fat free, and trans fat freeAlso scan the Nutrition Facts Label, which lists saturated fat, trans fat, and cholesterol amounts. For products low in sodium, look for sodium free, very low sodium, low sodium, no added salt, and unsalted   Skip the salt when cooking or at the table; if food needs more flavor, get creative and try out different herbs and spices. Garlic and onion also add substantial flavor to foods. Trim any visible fat off meat and poultry before cooking, and drain the fat off after jenkins. Use cooking methods that require little or no added fat, such as grilling, boiling, baking, poaching, broiling, roasting, steaming, stir-frying, and sauting. Avoid fast food and convenience food. They tend to be high in saturated and trans fat and have a lot of added salt. Talk to a registered dietitian for individualized diet advice.       Last Reviewed: March 2011 Bing Wallace MS, MPH, RD   Updated: 3/29/2011

## 2023-01-09 ENCOUNTER — HOSPITAL ENCOUNTER (OUTPATIENT)
Age: 68
Discharge: HOME | End: 2023-01-09
Payer: MEDICARE

## 2023-01-09 DIAGNOSIS — R73.01 IMPAIRED FASTING GLUCOSE: ICD-10-CM

## 2023-01-09 LAB
CREAT SERPL-MCNC: 0.76 MG/DL (ref 0.5–0.9)
GFR SERPL CREATININE-BSD FRML MDRD: >60 ML/MIN/1.73M2

## 2023-01-09 PROCEDURE — 36415 COLL VENOUS BLD VENIPUNCTURE: CPT

## 2023-01-09 PROCEDURE — 82565 ASSAY OF CREATININE: CPT

## 2023-01-10 RX ORDER — TIZANIDINE 4 MG/1
TABLET ORAL
Qty: 30 TABLET | Refills: 0 | Status: SHIPPED | OUTPATIENT
Start: 2023-01-10

## 2023-01-10 NOTE — TELEPHONE ENCOUNTER
[facility] called requesting a refill of the below medication which has been pended for you:     Requested Prescriptions     Pending Prescriptions Disp Refills    tiZANidine (ZANAFLEX) 4 MG tablet [Pharmacy Med Name: TIZANIDINE HCL 4MG TABS] 30 tablet 0     Sig: TAKE 1 TABLET BY MOUTH 3 TIMES A DAY AS NEEDED (MUSCLE SPASM)       Last Appointment Date: 12/27/2022  Next Appointment Date: 5/17/2023    Allergies   Allergen Reactions    Valtrex [Valacyclovir] Hives    Adhesive Tape     Bactrim [Sulfamethoxazole-Trimethoprim] Rash

## 2023-01-12 ENCOUNTER — HOSPITAL ENCOUNTER (OUTPATIENT)
Dept: MRI IMAGING | Age: 68
Discharge: HOME | End: 2023-01-14
Payer: MEDICARE

## 2023-01-12 ENCOUNTER — HOSPITAL ENCOUNTER (OUTPATIENT)
Dept: ULTRASOUND IMAGING | Age: 68
Discharge: HOME | End: 2023-01-14
Payer: MEDICARE

## 2023-01-12 DIAGNOSIS — Z15.09 BRCA2 POSITIVE: ICD-10-CM

## 2023-01-12 DIAGNOSIS — Z12.39 BREAST CANCER SCREENING, HIGH RISK PATIENT: ICD-10-CM

## 2023-01-12 DIAGNOSIS — N64.89 OTHER SPECIFIED DISORDERS OF BREAST: ICD-10-CM

## 2023-01-12 DIAGNOSIS — Z15.01 BRCA2 POSITIVE: ICD-10-CM

## 2023-01-12 PROCEDURE — 6360000004 HC RX CONTRAST MEDICATION: Performed by: FAMILY MEDICINE

## 2023-01-12 PROCEDURE — 76705 ECHO EXAM OF ABDOMEN: CPT

## 2023-01-12 PROCEDURE — 2709999900 MRI BREAST BILATERAL W WO CONTRAST

## 2023-01-12 PROCEDURE — A9579 GAD-BASE MR CONTRAST NOS,1ML: HCPCS | Performed by: FAMILY MEDICINE

## 2023-01-12 RX ADMIN — GADOTERIDOL 20 ML: 279.3 INJECTION, SOLUTION INTRAVENOUS at 09:18

## 2023-01-13 DIAGNOSIS — Z12.31 VISIT FOR SCREENING MAMMOGRAM: Primary | ICD-10-CM

## 2023-01-13 NOTE — PROGRESS NOTES
Order placed for screening mammogram 6/4/2023 or after per instruction from Dr Tamy Kim on breast MRI done 1/12/2023

## 2023-05-16 ENCOUNTER — HOSPITAL ENCOUNTER (OUTPATIENT)
Age: 68
Discharge: HOME | End: 2023-05-16
Payer: MEDICARE

## 2023-05-16 DIAGNOSIS — R73.01 IMPAIRED FASTING GLUCOSE: ICD-10-CM

## 2023-05-16 DIAGNOSIS — E78.2 MIXED HYPERLIPIDEMIA: ICD-10-CM

## 2023-05-16 LAB
ALBUMIN SERPL-MCNC: 4.4 G/DL (ref 3.5–5.2)
ALBUMIN/GLOB SERPL: 1.5 {RATIO} (ref 1–2.5)
ALP SERPL-CCNC: 84 U/L (ref 35–104)
ALT SERPL-CCNC: 15 U/L (ref 5–33)
ANION GAP SERPL CALCULATED.3IONS-SCNC: 10 MMOL/L (ref 9–17)
AST SERPL-CCNC: 14 U/L
BASOPHILS # BLD: 0.05 K/UL (ref 0–0.2)
BASOPHILS # BLD: 1 % (ref 0–2)
BILIRUB SERPL-MCNC: 1.8 MG/DL (ref 0.3–1.2)
BUN SERPL-MCNC: 23 MG/DL (ref 8–23)
BUN/CREAT BLD: 30 (ref 9–20)
CALCIUM SERPL-MCNC: 9.8 MG/DL (ref 8.6–10.4)
CHLORIDE SERPL-SCNC: 106 MMOL/L (ref 98–107)
CHOLEST SERPL-MCNC: 173 MG/DL
CHOLESTEROL/HDL RATIO: 3.5
CO2 SERPL-SCNC: 27 MMOL/L (ref 20–31)
CREAT SERPL-MCNC: 0.77 MG/DL (ref 0.5–0.9)
EOSINOPHIL # BLD: 0.36 K/UL (ref 0–0.44)
EOSINOPHILS RELATIVE PERCENT: 6 % (ref 1–4)
ERYTHROCYTE [DISTWIDTH] IN BLOOD BY AUTOMATED COUNT: 12.7 % (ref 11.8–14.4)
GFR SERPL CREATININE-BSD FRML MDRD: >60 ML/MIN/1.73M2
GLUCOSE SERPL-MCNC: 103 MG/DL (ref 70–99)
HCT VFR BLD AUTO: 41.2 % (ref 36.3–47.1)
HDLC SERPL-MCNC: 50 MG/DL
HGB BLD-MCNC: 13.1 G/DL (ref 11.9–15.1)
IMM GRANULOCYTES # BLD AUTO: <0.03 K/UL (ref 0–0.3)
IMM GRANULOCYTES NFR BLD: 0 %
LDLC SERPL CALC-MCNC: 104 MG/DL (ref 0–130)
LYMPHOCYTES # BLD: 31 % (ref 24–43)
LYMPHOCYTES NFR BLD: 1.99 K/UL (ref 1.1–3.7)
MCH RBC QN AUTO: 27.3 PG (ref 25.2–33.5)
MCHC RBC AUTO-ENTMCNC: 31.8 G/DL (ref 25.2–33.5)
MCV RBC AUTO: 85.8 FL (ref 82.6–102.9)
MONOCYTES NFR BLD: 0.46 K/UL (ref 0.1–1.2)
MONOCYTES NFR BLD: 7 % (ref 3–12)
NEUTROPHILS NFR BLD: 55 % (ref 36–65)
NEUTS SEG NFR BLD: 3.47 K/UL (ref 1.5–8.1)
NRBC AUTOMATED: 0 PER 100 WBC
PLATELET # BLD AUTO: 300 K/UL (ref 138–453)
PMV BLD AUTO: 8.5 FL (ref 8.1–13.5)
POTASSIUM SERPL-SCNC: 4.2 MMOL/L (ref 3.7–5.3)
PROT SERPL-MCNC: 7.4 G/DL (ref 6.4–8.3)
RBC # BLD AUTO: 4.8 M/UL (ref 3.95–5.11)
SODIUM SERPL-SCNC: 143 MMOL/L (ref 135–144)
TRIGL SERPL-MCNC: 95 MG/DL
WBC OTHER # BLD: 6.4 K/UL (ref 3.5–11.3)

## 2023-05-16 PROCEDURE — 80053 COMPREHEN METABOLIC PANEL: CPT

## 2023-05-16 PROCEDURE — 80061 LIPID PANEL: CPT

## 2023-05-16 PROCEDURE — 85025 COMPLETE CBC W/AUTO DIFF WBC: CPT

## 2023-05-16 PROCEDURE — 83036 HEMOGLOBIN GLYCOSYLATED A1C: CPT

## 2023-05-16 PROCEDURE — 36415 COLL VENOUS BLD VENIPUNCTURE: CPT

## 2023-05-17 ENCOUNTER — OFFICE VISIT (OUTPATIENT)
Dept: FAMILY MEDICINE CLINIC | Age: 68
End: 2023-05-17
Payer: MEDICARE

## 2023-05-17 VITALS
WEIGHT: 221 LBS | HEIGHT: 64 IN | HEART RATE: 76 BPM | RESPIRATION RATE: 18 BRPM | BODY MASS INDEX: 37.73 KG/M2 | DIASTOLIC BLOOD PRESSURE: 80 MMHG | OXYGEN SATURATION: 99 % | SYSTOLIC BLOOD PRESSURE: 134 MMHG | TEMPERATURE: 97 F

## 2023-05-17 DIAGNOSIS — E78.2 MIXED HYPERLIPIDEMIA: ICD-10-CM

## 2023-05-17 DIAGNOSIS — E66.01 SEVERE OBESITY (BMI 35.0-39.9) WITH COMORBIDITY (HCC): ICD-10-CM

## 2023-05-17 DIAGNOSIS — R73.01 IMPAIRED FASTING GLUCOSE: Primary | ICD-10-CM

## 2023-05-17 DIAGNOSIS — K21.9 GASTROESOPHAGEAL REFLUX DISEASE WITHOUT ESOPHAGITIS: ICD-10-CM

## 2023-05-17 LAB
EST. AVERAGE GLUCOSE BLD GHB EST-MCNC: 105 MG/DL
HBA1C MFR BLD: 5.3 % (ref 4–6)

## 2023-05-17 PROCEDURE — 1123F ACP DISCUSS/DSCN MKR DOCD: CPT | Performed by: FAMILY MEDICINE

## 2023-05-17 PROCEDURE — 99214 OFFICE O/P EST MOD 30 MIN: CPT | Performed by: FAMILY MEDICINE

## 2023-05-17 PROCEDURE — 99212 OFFICE O/P EST SF 10 MIN: CPT | Performed by: FAMILY MEDICINE

## 2023-05-17 SDOH — ECONOMIC STABILITY: FOOD INSECURITY: WITHIN THE PAST 12 MONTHS, YOU WORRIED THAT YOUR FOOD WOULD RUN OUT BEFORE YOU GOT MONEY TO BUY MORE.: NEVER TRUE

## 2023-05-17 SDOH — ECONOMIC STABILITY: FOOD INSECURITY: WITHIN THE PAST 12 MONTHS, THE FOOD YOU BOUGHT JUST DIDN'T LAST AND YOU DIDN'T HAVE MONEY TO GET MORE.: NEVER TRUE

## 2023-05-17 SDOH — ECONOMIC STABILITY: HOUSING INSECURITY
IN THE LAST 12 MONTHS, WAS THERE A TIME WHEN YOU DID NOT HAVE A STEADY PLACE TO SLEEP OR SLEPT IN A SHELTER (INCLUDING NOW)?: NO

## 2023-05-17 SDOH — ECONOMIC STABILITY: INCOME INSECURITY: HOW HARD IS IT FOR YOU TO PAY FOR THE VERY BASICS LIKE FOOD, HOUSING, MEDICAL CARE, AND HEATING?: NOT HARD AT ALL

## 2023-05-17 ASSESSMENT — ENCOUNTER SYMPTOMS
RHINORRHEA: 0
SINUS PRESSURE: 0
SHORTNESS OF BREATH: 0
ABDOMINAL PAIN: 0
EYE REDNESS: 0
NAUSEA: 0
EYE DISCHARGE: 0
COUGH: 0
TROUBLE SWALLOWING: 0
WHEEZING: 0
SORE THROAT: 0
CONSTIPATION: 0
DIARRHEA: 0
VOMITING: 0

## 2023-05-17 ASSESSMENT — PATIENT HEALTH QUESTIONNAIRE - PHQ9
SUM OF ALL RESPONSES TO PHQ QUESTIONS 1-9: 0
1. LITTLE INTEREST OR PLEASURE IN DOING THINGS: 0
SUM OF ALL RESPONSES TO PHQ QUESTIONS 1-9: 0
SUM OF ALL RESPONSES TO PHQ QUESTIONS 1-9: 0
2. FEELING DOWN, DEPRESSED OR HOPELESS: 0
SUM OF ALL RESPONSES TO PHQ QUESTIONS 1-9: 0
SUM OF ALL RESPONSES TO PHQ9 QUESTIONS 1 & 2: 0

## 2023-05-22 PROBLEM — E66.01 SEVERE OBESITY (BMI 35.0-39.9) WITH COMORBIDITY (HCC): Status: ACTIVE | Noted: 2023-05-22

## 2023-05-22 PROBLEM — K21.9 GASTROESOPHAGEAL REFLUX DISEASE WITHOUT ESOPHAGITIS: Status: ACTIVE | Noted: 2023-05-22

## 2023-06-05 ENCOUNTER — HOSPITAL ENCOUNTER (OUTPATIENT)
Dept: MAMMOGRAPHY | Age: 68
Discharge: HOME | End: 2023-06-07
Payer: MEDICARE

## 2023-06-05 DIAGNOSIS — Z12.31 VISIT FOR SCREENING MAMMOGRAM: ICD-10-CM

## 2023-06-05 PROCEDURE — 77063 BREAST TOMOSYNTHESIS BI: CPT

## 2023-11-20 ENCOUNTER — PATIENT MESSAGE (OUTPATIENT)
Dept: FAMILY MEDICINE CLINIC | Age: 68
End: 2023-11-20

## 2023-11-20 DIAGNOSIS — Z91.89 AT HIGH RISK FOR BREAST CANCER: Primary | ICD-10-CM

## 2023-11-20 DIAGNOSIS — Z85.3 PERSONAL HISTORY OF MALIGNANT NEOPLASM OF BREAST: ICD-10-CM

## 2023-11-20 DIAGNOSIS — Z12.31 VISIT FOR SCREENING MAMMOGRAM: ICD-10-CM

## 2023-11-20 DIAGNOSIS — Z84.81 FAMILY HISTORY OF BREAST CANCER GENE MUTATION IN FIRST DEGREE RELATIVE: ICD-10-CM

## 2023-11-20 DIAGNOSIS — Z15.09 BRCA2 POSITIVE: ICD-10-CM

## 2023-11-20 DIAGNOSIS — Z15.01 BRCA2 POSITIVE: ICD-10-CM

## 2023-11-21 NOTE — TELEPHONE ENCOUNTER
From: Adryan Rosen  To: Dr. Meehan Diss: 11/20/2023 10:47 AM EST  Subject: Schedule    Hi Eli Griffith,  I received a letter about scheduling my mammogram but it isn't until June and the letter said December. Anyway, I called to schedule and they told me to schedule it for June of 2024 but I need to schedule the MRI then. I need to have that requested by you to have it scheduled. Anyway, was hoping to get it scheduled for January sometime. MRI in January and Mammogram in June RIGHT? Happy Thanksgiving to all of you!   Colin Bautista

## 2024-01-09 RX ORDER — OMEPRAZOLE 20 MG/1
20 CAPSULE, DELAYED RELEASE ORAL DAILY
Qty: 90 CAPSULE | Refills: 3 | Status: SHIPPED | OUTPATIENT
Start: 2024-01-09

## 2024-02-01 ENCOUNTER — HOSPITAL ENCOUNTER (OUTPATIENT)
Age: 69
Discharge: HOME | End: 2024-02-01
Payer: MEDICARE

## 2024-02-01 DIAGNOSIS — Z15.01 BRCA2 POSITIVE: ICD-10-CM

## 2024-02-01 DIAGNOSIS — Z15.09 BRCA2 POSITIVE: ICD-10-CM

## 2024-02-01 LAB
CREAT SERPL-MCNC: 0.7 MG/DL (ref 0.5–0.9)
GFR SERPL CREATININE-BSD FRML MDRD: >60 ML/MIN/1.73M2

## 2024-02-01 PROCEDURE — 82565 ASSAY OF CREATININE: CPT

## 2024-02-01 PROCEDURE — 36415 COLL VENOUS BLD VENIPUNCTURE: CPT

## 2024-02-05 ENCOUNTER — HOSPITAL ENCOUNTER (OUTPATIENT)
Dept: MRI IMAGING | Age: 69
Discharge: HOME | End: 2024-02-07
Attending: FAMILY MEDICINE
Payer: MEDICARE

## 2024-02-05 DIAGNOSIS — Z84.81 FAMILY HISTORY OF GENETIC DISEASE CARRIER: ICD-10-CM

## 2024-02-05 DIAGNOSIS — Z84.81 FAMILY HISTORY OF BREAST CANCER GENE MUTATION IN FIRST DEGREE RELATIVE: ICD-10-CM

## 2024-02-05 DIAGNOSIS — Z85.3 PERSONAL HISTORY OF MALIGNANT NEOPLASM OF BREAST: ICD-10-CM

## 2024-02-05 PROCEDURE — C8908 MRI W/O FOL W/CONT, BREAST,: HCPCS

## 2024-02-05 PROCEDURE — 6360000004 HC RX CONTRAST MEDICATION: Performed by: FAMILY MEDICINE

## 2024-02-05 PROCEDURE — A9579 GAD-BASE MR CONTRAST NOS,1ML: HCPCS | Performed by: FAMILY MEDICINE

## 2024-02-05 RX ADMIN — GADOTERIDOL 20 ML: 279.3 INJECTION, SOLUTION INTRAVENOUS at 11:10

## 2024-02-20 ENCOUNTER — TELEPHONE (OUTPATIENT)
Dept: GENERAL RADIOLOGY | Age: 69
End: 2024-02-20

## 2024-02-20 NOTE — TELEPHONE ENCOUNTER
[facility] has put a corrected report under the new MRI breast order that I placed.  They are unable to delete the original report.  [facility] has handled the billing on their end to delete any duplicate charges, and I have taken care of the MRI with our revenue team to ensure there were no duplicate charges.  Thanks,  Danika  Radiology

## 2024-06-02 SDOH — ECONOMIC STABILITY: INCOME INSECURITY: HOW HARD IS IT FOR YOU TO PAY FOR THE VERY BASICS LIKE FOOD, HOUSING, MEDICAL CARE, AND HEATING?: NOT VERY HARD

## 2024-06-02 SDOH — HEALTH STABILITY: PHYSICAL HEALTH: ON AVERAGE, HOW MANY MINUTES DO YOU ENGAGE IN EXERCISE AT THIS LEVEL?: 30 MIN

## 2024-06-02 SDOH — ECONOMIC STABILITY: FOOD INSECURITY: WITHIN THE PAST 12 MONTHS, THE FOOD YOU BOUGHT JUST DIDN'T LAST AND YOU DIDN'T HAVE MONEY TO GET MORE.: NEVER TRUE

## 2024-06-02 SDOH — ECONOMIC STABILITY: TRANSPORTATION INSECURITY
IN THE PAST 12 MONTHS, HAS LACK OF TRANSPORTATION KEPT YOU FROM MEETINGS, WORK, OR FROM GETTING THINGS NEEDED FOR DAILY LIVING?: NO

## 2024-06-02 SDOH — ECONOMIC STABILITY: FOOD INSECURITY: WITHIN THE PAST 12 MONTHS, YOU WORRIED THAT YOUR FOOD WOULD RUN OUT BEFORE YOU GOT MONEY TO BUY MORE.: NEVER TRUE

## 2024-06-02 SDOH — HEALTH STABILITY: PHYSICAL HEALTH: ON AVERAGE, HOW MANY DAYS PER WEEK DO YOU ENGAGE IN MODERATE TO STRENUOUS EXERCISE (LIKE A BRISK WALK)?: 2 DAYS

## 2024-06-02 ASSESSMENT — LIFESTYLE VARIABLES
HOW OFTEN DO YOU HAVE A DRINK CONTAINING ALCOHOL: 2
HOW MANY STANDARD DRINKS CONTAINING ALCOHOL DO YOU HAVE ON A TYPICAL DAY: PATIENT DOES NOT DRINK
HOW OFTEN DO YOU HAVE A DRINK CONTAINING ALCOHOL: MONTHLY OR LESS
HOW OFTEN DO YOU HAVE SIX OR MORE DRINKS ON ONE OCCASION: 1
HOW MANY STANDARD DRINKS CONTAINING ALCOHOL DO YOU HAVE ON A TYPICAL DAY: 0

## 2024-06-02 ASSESSMENT — PATIENT HEALTH QUESTIONNAIRE - PHQ9
SUM OF ALL RESPONSES TO PHQ QUESTIONS 1-9: 0
1. LITTLE INTEREST OR PLEASURE IN DOING THINGS: NOT AT ALL
SUM OF ALL RESPONSES TO PHQ QUESTIONS 1-9: 0
SUM OF ALL RESPONSES TO PHQ9 QUESTIONS 1 & 2: 0
2. FEELING DOWN, DEPRESSED OR HOPELESS: NOT AT ALL
SUM OF ALL RESPONSES TO PHQ QUESTIONS 1-9: 0
SUM OF ALL RESPONSES TO PHQ QUESTIONS 1-9: 0

## 2024-06-03 ENCOUNTER — HOSPITAL ENCOUNTER (OUTPATIENT)
Age: 69
Discharge: HOME | End: 2024-06-03
Payer: MEDICARE

## 2024-06-03 DIAGNOSIS — E78.2 MIXED HYPERLIPIDEMIA: ICD-10-CM

## 2024-06-03 DIAGNOSIS — R73.01 IMPAIRED FASTING GLUCOSE: ICD-10-CM

## 2024-06-03 LAB
ALBUMIN SERPL-MCNC: 4.2 G/DL (ref 3.5–5.2)
ALBUMIN/GLOB SERPL: 1.4 {RATIO} (ref 1–2.5)
ALP SERPL-CCNC: 96 U/L (ref 35–104)
ALT SERPL-CCNC: 17 U/L (ref 5–33)
ANION GAP SERPL CALCULATED.3IONS-SCNC: 9 MMOL/L (ref 9–17)
AST SERPL-CCNC: 17 U/L
BASOPHILS # BLD: 0.05 K/UL (ref 0–0.2)
BASOPHILS NFR BLD: 1 % (ref 0–2)
BILIRUB SERPL-MCNC: 1.1 MG/DL (ref 0.3–1.2)
BUN SERPL-MCNC: 18 MG/DL (ref 8–23)
BUN/CREAT SERPL: 26 (ref 9–20)
CALCIUM SERPL-MCNC: 9.3 MG/DL (ref 8.6–10.4)
CHLORIDE SERPL-SCNC: 106 MMOL/L (ref 98–107)
CHOLEST SERPL-MCNC: 193 MG/DL (ref 0–199)
CHOLESTEROL/HDL RATIO: 3
CO2 SERPL-SCNC: 27 MMOL/L (ref 20–31)
CREAT SERPL-MCNC: 0.7 MG/DL (ref 0.5–0.9)
EOSINOPHIL # BLD: 0.33 K/UL (ref 0–0.44)
EOSINOPHILS RELATIVE PERCENT: 5 % (ref 1–4)
ERYTHROCYTE [DISTWIDTH] IN BLOOD BY AUTOMATED COUNT: 12.8 % (ref 11.8–14.4)
EST. AVERAGE GLUCOSE BLD GHB EST-MCNC: 111 MG/DL
GFR, ESTIMATED: >90 ML/MIN/1.73M2
GLUCOSE SERPL-MCNC: 113 MG/DL (ref 70–99)
HBA1C MFR BLD: 5.5 % (ref 4–6)
HCT VFR BLD AUTO: 41.9 % (ref 36.3–47.1)
HDLC SERPL-MCNC: 58 MG/DL
HGB BLD-MCNC: 13.5 G/DL (ref 11.9–15.1)
IMM GRANULOCYTES # BLD AUTO: <0.03 K/UL (ref 0–0.3)
IMM GRANULOCYTES NFR BLD: 0 %
LDLC SERPL CALC-MCNC: 117 MG/DL (ref 0–100)
LYMPHOCYTES NFR BLD: 2.07 K/UL (ref 1.1–3.7)
LYMPHOCYTES RELATIVE PERCENT: 30 % (ref 24–43)
MCH RBC QN AUTO: 27.6 PG (ref 25.2–33.5)
MCHC RBC AUTO-ENTMCNC: 32.2 G/DL (ref 25.2–33.5)
MCV RBC AUTO: 85.7 FL (ref 82.6–102.9)
MONOCYTES NFR BLD: 0.44 K/UL (ref 0.1–1.2)
MONOCYTES NFR BLD: 7 % (ref 3–12)
NEUTROPHILS NFR BLD: 57 % (ref 36–65)
NEUTS SEG NFR BLD: 3.89 K/UL (ref 1.5–8.1)
NRBC BLD-RTO: 0 PER 100 WBC
PLATELET # BLD AUTO: 314 K/UL (ref 138–453)
PMV BLD AUTO: 8.5 FL (ref 8.1–13.5)
POTASSIUM SERPL-SCNC: 4 MMOL/L (ref 3.7–5.3)
PROT SERPL-MCNC: 7.1 G/DL (ref 6.4–8.3)
RBC # BLD AUTO: 4.89 M/UL (ref 3.95–5.11)
SODIUM SERPL-SCNC: 142 MMOL/L (ref 135–144)
TRIGL SERPL-MCNC: 93 MG/DL
VLDLC SERPL CALC-MCNC: 19 MG/DL
WBC OTHER # BLD: 6.8 K/UL (ref 3.5–11.3)

## 2024-06-03 PROCEDURE — 80061 LIPID PANEL: CPT

## 2024-06-03 PROCEDURE — 80053 COMPREHEN METABOLIC PANEL: CPT

## 2024-06-03 PROCEDURE — 36415 COLL VENOUS BLD VENIPUNCTURE: CPT

## 2024-06-03 PROCEDURE — 83036 HEMOGLOBIN GLYCOSYLATED A1C: CPT

## 2024-06-03 PROCEDURE — 85025 COMPLETE CBC W/AUTO DIFF WBC: CPT

## 2024-06-05 ENCOUNTER — OFFICE VISIT (OUTPATIENT)
Dept: FAMILY MEDICINE CLINIC | Age: 69
End: 2024-06-05

## 2024-06-05 VITALS
DIASTOLIC BLOOD PRESSURE: 84 MMHG | TEMPERATURE: 97 F | HEART RATE: 72 BPM | SYSTOLIC BLOOD PRESSURE: 138 MMHG | OXYGEN SATURATION: 96 % | HEIGHT: 64 IN | RESPIRATION RATE: 18 BRPM | WEIGHT: 226 LBS | BODY MASS INDEX: 38.58 KG/M2

## 2024-06-05 DIAGNOSIS — E78.2 MIXED HYPERLIPIDEMIA: ICD-10-CM

## 2024-06-05 DIAGNOSIS — Z12.31 ENCOUNTER FOR SCREENING MAMMOGRAM FOR MALIGNANT NEOPLASM OF BREAST: ICD-10-CM

## 2024-06-05 DIAGNOSIS — R73.01 IMPAIRED FASTING GLUCOSE: Primary | ICD-10-CM

## 2024-06-05 DIAGNOSIS — Z00.00 MEDICARE ANNUAL WELLNESS VISIT, SUBSEQUENT: ICD-10-CM

## 2024-06-05 DIAGNOSIS — K21.9 GASTROESOPHAGEAL REFLUX DISEASE WITHOUT ESOPHAGITIS: ICD-10-CM

## 2024-06-05 RX ORDER — CLOBETASOL PROPIONATE 0.5 MG/G
CREAM TOPICAL
COMMUNITY
Start: 2024-04-17

## 2024-06-05 ASSESSMENT — ENCOUNTER SYMPTOMS
DIARRHEA: 0
ABDOMINAL PAIN: 0
SORE THROAT: 0
EYE REDNESS: 0
VOMITING: 0
CONSTIPATION: 0
COUGH: 0
WHEEZING: 0
SHORTNESS OF BREATH: 0
SINUS PRESSURE: 0
EYE DISCHARGE: 0
NAUSEA: 0
TROUBLE SWALLOWING: 0
RHINORRHEA: 0

## 2024-06-05 NOTE — PATIENT INSTRUCTIONS
Try to avoid secondhand smoke too.     Stay at a weight that's healthy for you. Talk to your doctor if you need help losing weight.     Try to get 7 to 9 hours of sleep each night.     Limit alcohol to 2 drinks a day for men and 1 drink a day for women. Too much alcohol can cause health problems.     Manage other health problems such as diabetes, high blood pressure, and high cholesterol. If you think you may have a problem with alcohol or drug use, talk to your doctor.   Medicines    Take your medicines exactly as prescribed. Call your doctor if you think you are having a problem with your medicine.     If your doctor recommends aspirin, take the amount directed each day. Make sure you take aspirin and not another kind of pain reliever, such as acetaminophen (Tylenol).   When should you call for help?   Call 911 if you have symptoms of a heart attack. These may include:    Chest pain or pressure, or a strange feeling in the chest.     Sweating.     Shortness of breath.     Pain, pressure, or a strange feeling in the back, neck, jaw, or upper belly or in one or both shoulders or arms.     Lightheadedness or sudden weakness.     A fast or irregular heartbeat.   After you call 911, the  may tell you to chew 1 adult-strength or 2 to 4 low-dose aspirin. Wait for an ambulance. Do not try to drive yourself.  Watch closely for changes in your health, and be sure to contact your doctor if you have any problems.  Where can you learn more?  Go to https://www.[employer].net/patientEd and enter F075 to learn more about \"A Healthy Heart: Care Instructions.\"  Current as of: June 24, 2023  Content Version: 14.1  © 8213-8813 [employer].   Care instructions adapted under license by [employer]. If you have questions about a medical condition or this instruction, always ask your healthcare professional. [employer] disclaims any warranty or liability for your use of this

## 2024-06-05 NOTE — PROGRESS NOTES
Medications    Medication Sig Taking? Authorizing Provider   clobetasol (TEMOVATE) 0.05 % cream  Yes ProviderGuillermo MD   omeprazole (PRILOSEC) 20 MG delayed release capsule TAKE ONE CAPSULE BY MOUTH ONCE A DAY Yes Pierre Hood MD   ibuprofen (ADVIL;MOTRIN) 600 MG tablet Take 1 tablet by mouth every 8 hours as needed for Pain Yes ProviderGuillermo MD   Multiple Vitamins-Minerals (MULTIVITAMIN PO) Take 1 tablet by mouth daily. Yes ProviderGuillermo MD       CareTeam (Including outside providers/suppliers regularly involved in providing care):   Patient Care Team:  Caro Leavitt DO as PCP - General (Family Medicine)  Caro Leavitt DO as PCP - Empaneled Provider     Reviewed and updated this visit:  Tobacco  Allergies  Meds  Med Hx  Surg Hx  Soc Hx  Fam Hx           
rhinorrhea, sinus pressure, sore throat and trouble swallowing.    Eyes:  Negative for discharge and redness.   Respiratory:  Negative for cough, shortness of breath and wheezing.    Cardiovascular:  Negative for chest pain.   Gastrointestinal:  Negative for abdominal pain, constipation, diarrhea, nausea and vomiting.   Genitourinary:  Negative for dysuria, flank pain, frequency and urgency.   Musculoskeletal:  Negative for arthralgias, myalgias and neck pain.   Skin:  Negative for rash and wound.   Allergic/Immunologic: Negative for environmental allergies.   Neurological:  Negative for dizziness, weakness, light-headedness and headaches.   Hematological:  Negative for adenopathy.   Psychiatric/Behavioral: Negative.           Prior to Visit Medications    Medication Sig Taking? Authorizing Provider   clobetasol (TEMOVATE) 0.05 % cream  Yes Guillermo Magdaleno MD   omeprazole (PRILOSEC) 20 MG delayed release capsule TAKE ONE CAPSULE BY MOUTH ONCE A DAY Yes Pierre Hood MD   ibuprofen (ADVIL;MOTRIN) 600 MG tablet Take 1 tablet by mouth every 8 hours as needed for Pain Yes Guillermo Magdaleno MD   Multiple Vitamins-Minerals (MULTIVITAMIN PO) Take 1 tablet by mouth daily. Yes Guillermo Magdaleno MD        Social History     Tobacco Use    Smoking status: Never     Passive exposure: Never    Smokeless tobacco: Never   Substance Use Topics    Alcohol use: No        Vitals:    06/05/24 0935 06/05/24 0944   BP: (!) 144/90 138/84   Site: Right Upper Arm Right Upper Arm   Position: Sitting Sitting   Cuff Size: Medium Adult Large Adult   Pulse: 72    Resp: 18    Temp: 97 °F (36.1 °C)    TempSrc: Tympanic    SpO2: 96%    Weight: 102.5 kg (226 lb)    Height: 1.626 m (5' 4\")      Estimated body mass index is 38.79 kg/m² as calculated from the following:    Height as of this encounter: 1.626 m (5' 4\").    Weight as of this encounter: 102.5 kg (226 lb).    Physical Exam  Vitals and nursing note reviewed.   Constitutional:

## 2024-06-27 ENCOUNTER — HOSPITAL ENCOUNTER (OUTPATIENT)
Dept: MAMMOGRAPHY | Age: 69
Discharge: HOME | End: 2024-06-29
Attending: FAMILY MEDICINE
Payer: MEDICARE

## 2024-06-27 VITALS — HEIGHT: 64 IN | WEIGHT: 226 LBS | BODY MASS INDEX: 38.58 KG/M2

## 2024-06-27 DIAGNOSIS — Z12.31 VISIT FOR SCREENING MAMMOGRAM: ICD-10-CM

## 2024-06-27 PROCEDURE — 77063 BREAST TOMOSYNTHESIS BI: CPT

## 2024-06-28 DIAGNOSIS — Z85.3 PERSONAL HISTORY OF MALIGNANT NEOPLASM OF BREAST: ICD-10-CM

## 2024-06-28 DIAGNOSIS — Z84.81 FAMILY HISTORY OF BREAST CANCER GENE MUTATION IN FIRST DEGREE RELATIVE: Primary | ICD-10-CM

## 2024-09-12 ENCOUNTER — TELEPHONE (OUTPATIENT)
Age: 69
End: 2024-09-12

## 2024-09-18 ENCOUNTER — TELEPHONE (OUTPATIENT)
Dept: SURGERY | Age: 69
End: 2024-09-18

## 2024-10-14 ENCOUNTER — PREP FOR PROCEDURE (OUTPATIENT)
Age: 69
End: 2024-10-14

## 2024-10-14 DIAGNOSIS — L90.0 LICHEN SCLEROSUS: ICD-10-CM

## 2024-10-14 DIAGNOSIS — R87.613 HSIL (HIGH GRADE SQUAMOUS INTRAEPITHELIAL LESION) ON PAP SMEAR OF CERVIX: ICD-10-CM

## 2024-10-24 DIAGNOSIS — Z01.818 PRE-OP TESTING: Primary | ICD-10-CM

## 2024-10-28 ENCOUNTER — PATIENT MESSAGE (OUTPATIENT)
Age: 69
End: 2024-10-28

## 2024-10-28 NOTE — PROGRESS NOTES
[facility], [facility] UROGYNECOLOGY AND PELVIC REHABILITATION   [address] [place] [address]  Dept: [phone]  Date: 10/29/2024  Patient Name: Agnes Jerome    VISIT - FOLLOW UP VISIT     CC: had concerns including Surgical Consult (Pt scheduled for vaginal colp 11/06/24).    Chaperone present for entire visit and pertinent physical exam:Resident    HPI: Using clobetasol for LS - keeping stable. Scheduled for colposcopy with vilvar biopsies in November. No changes in symptoms. Support is good. No vaginal bleeding.    Overall state of well-being, dietary and nutritional habits, exercise routines of at least 30 minutes 3 times a week, bowel and bladder function, smoking history, HRT history, sexual activity and partner/s, dyspareunia, and immunizations all revisited if necessary by chart review or direct questioning.    Topics of Prolapse, Pain, Pressure were revisited including type, period of onset, level of severity, quality and quantity, associations, trends, exacerbators, alleviators, bleeding, prolapse to reduce, splinting, and prior treatment / surgery.    Topics of Urinary Leakage were revisited including type, period of onset, level of severity, quality and quantity, associations, trends, exacerbators, alleviators, urgency, frequency, nocturia ,urge incontinence / stress incontinence triggers, hesitancy, obstruction with need to catheterize, frequent UTI\"s >3 in a year diagnosed by culture, hematuria (gross or microscopic), urinary stones, and prior treatment / surgery.    Topics of Fecal Incontinence were revisited including type, period of onset, level of severity, quality and quantity, associations, trends, exacerbators, alleviators, times per day/week, stool quality / quantity, rectal bleeding, hemorrhoids, constipation / Anismus / Proctalgia fugax, laxative abuse, and prior treatment / surgery.    Topics of General Gynecology

## 2024-10-29 ENCOUNTER — INITIAL CONSULT (OUTPATIENT)
Age: 69
End: 2024-10-29

## 2024-10-29 VITALS — SYSTOLIC BLOOD PRESSURE: 140 MMHG | DIASTOLIC BLOOD PRESSURE: 80 MMHG

## 2024-10-29 DIAGNOSIS — R87.613 HSIL (HIGH GRADE SQUAMOUS INTRAEPITHELIAL LESION) ON PAP SMEAR OF CERVIX: ICD-10-CM

## 2024-10-29 DIAGNOSIS — Z41.9 ELECTIVE SURGERY: ICD-10-CM

## 2024-10-29 DIAGNOSIS — L90.0 LICHEN SCLEROSUS: Primary | ICD-10-CM

## 2024-10-29 NOTE — PATIENT INSTRUCTIONS
likely if retention predates surgery or with factors included but not limited to neuropathy, diabetes, or prior pelvic surgery. These patients may need a catheter to drain the bladder for 1-2 weeks on average. Patients in Dr. Salazar's practice prefer a transurethral kwok and the FLOTUS trial at home voiding trial recommendations will be given to the patient as necessary and if indicated. Other choices are a suprapubic catheter or intermittent self-catheterization. The patient will be given instructions on how to manage the type of catheter she prefers or is recommended. Despite evidence indicating no need for antibiotics with a catheter, real world experience shows a 20-40% rate of UTI with a indwelling catheter. Depending on personal risk factors and the extent of surgery, a prophylactic antibiotic may be required afterwards. Antibiotics have risks of diarrhea and c-difficile infection. Suprapubic catheters carry a risk of urinoma, bowel injury and bleeding, and have a uti risk of 15-20%. Intermittent self-catheterization carries a 11% risk of a UTI.   Adriane, A1; Delroy, C2; Jimi, H2; El, [id number] Cohort of In-Office Backfill-Assisted Voiding Trial Versus At-Home Catheter Discontinuation with Autofill Voiding Trial on the Day after Urogynecologic Surgery. FLOTUS trial followup. 1 - [facility], 2 - [facility], 3 - [facility]. Presented at Society of Gynecologic Surgeons 2024 Annual Conference.

## 2024-11-04 ENCOUNTER — HOSPITAL ENCOUNTER (OUTPATIENT)
Dept: NON INVASIVE DIAGNOSTICS | Age: 69
Discharge: HOME | End: 2024-11-04
Payer: MEDICARE

## 2024-11-04 DIAGNOSIS — Z01.818 PRE-OP TESTING: ICD-10-CM

## 2024-11-04 PROCEDURE — 93005 ELECTROCARDIOGRAM TRACING: CPT

## 2024-11-04 NOTE — DISCHARGE SUMMARY
Urogynecology Discharge Summary  [facility]      Patient Name: Agnes Jerome  Patient : 1955  Primary Care Physician: Caro Leavitt DO  Admit Date: 2024    Principal Diagnosis: Lichen sclerosis, HSIL    Other Diagnosis:   Lichen sclerosus [L90.0]  HSIL (high grade squamous intraepithelial lesion) on Pap smear of cervix [R87.613]  Patient Active Problem List   Diagnosis    Impaired fasting glucose    Mixed hyperlipidemia    Severe obesity (BMI 35.0-39.9) with comorbidity    Gastroesophageal reflux disease without esophagitis    Lichen sclerosus    HSIL (high grade squamous intraepithelial lesion) on Pap smear of cervix    History of colon polyps    Family history of colon cancer       Infection: No  Hospital Acquired: No    Surgical Operations & Procedures: vaginal colposcopy with vulvovaginal biopsies    Consultations: Anesthesia    Pertinent Findings & Procedures:   Agnes Jerome is a 68 y.o. female , admitted for surgical management for co-morbidity indicated above as principal diagnosis.  She underwent vaginal colposcopy with vulvovaginal biopsies on 24.  She was discharged home without a kwok catheter. Post-op course normal, discharged home on POD#0.  Follow up in 1 week. Discharge instructions reviewed and questions answered.    Course of patient: normal    Discharge to: Home    Readmission planned: No    Recommendations on Discharge:     Medications:     Medication List        CONTINUE taking these medications      clobetasol 0.05 % cream  Commonly known as: TEMOVATE     ibuprofen 600 MG tablet  Commonly known as: ADVIL;MOTRIN     MULTIVITAMIN PO     omeprazole 20 MG delayed release capsule  Commonly known as: PRILOSEC  TAKE ONE CAPSULE BY MOUTH ONCE A DAY            Activity: pelvic rest x 3-4 weeks, no driving on narcotics, no lifting greater than 10-15 lbs  Diet: regular diet  Follow up: 1 week     Condition on discharge: good and stable   Discharge

## 2024-11-04 NOTE — H&P
UroGyn Pre-Op H&P  [facility]    Patient Name: Agnes Jerome     Patient : 1955  Room/Bed: [facility] OR [facility]/NONE  Admission Date/Time: 2024  7:10 AM  Primary Care Physician: Caro Leavitt DO  MRN: [id number]    Date: 2024  Time: 7:58 AM    The patient was seen in pre-op holding. She is here for Colposcopy with vaginal and vulva biopsies.    The patient is being admitted for a planned elective surgical procedure today. She has had symptoms, physical findings, and diagnostic testing that have an appropriate indication for today's planned procedure. The patient has been educated about their condition, conservative and surgical options was been offered to the patient, and the patient has decided to proceed with a surgical option. An informed consent has been signed under no duress or confusion. If indicated, the patient has been surgically cleared by her PCP and /or any pertinent specialist. All labs and testing have been reviewed. If of reproductive age and without permanent sterilization, a pregnancy test was confirmed as negative. The patient has satisfactorily completed any pre-operative preparations prior to surgery. If MRSA positive, she had completed the standard surgical preparation protocol. The patient took any required medicines prior to surgery with a sip of water but otherwise had taken nothing by mouth. The patient has discontinued any blood thinners as required to proceed with surgery. The patient denies chest pain, shortness of breath, calf pain, or open sores on the day of surgery. All dentures and body jewelry have been removed and / or taped.      REVIEW OF SYSTEMS:  14 point ROS negative except for above listed in HPI.   General/Constitutional: Denies chills, fever, headaches, weight gain, weight loss   Ophthalmologic: Denies recent abrupt vision changes, blurry vision   ENT: Denies nasal discharge, congestion, sinus pain, sore throat, thyroid changes      Allergen Reaction Noted    Valtrex [valacyclovir] Hives 05/11/2022    Adhesive tape  08/22/2016    Bactrim [sulfamethoxazole-trimethoprim] Rash 08/06/2013       MEDICATIONS:  Current Facility-Administered Medications   Medication Dose Route Frequency Provider Last Rate Last Admin    0.9 % sodium chloride infusion   IntraVENous Continuous Cyndy Eason MD        lactated ringers infusion   IntraVENous Continuous Cyndy Eason MD        sodium chloride flush 0.9 % injection 5-40 mL  5-40 mL IntraVENous 2 times per day Cyndy Eason MD        sodium chloride flush 0.9 % injection 5-40 mL  5-40 mL IntraVENous PRN Cyndy Eason MD        0.9 % sodium chloride infusion   IntraVENous PRN Cyndy Eason MD           FAMILY HISTORY:  family history includes Breast Cancer (age of onset: 70) in her paternal aunt, paternal aunt, and paternal aunt; Cancer in her father, mother, paternal aunt, and paternal aunt; Cancer (age of onset: 50) in her sister; Colon Cancer in her father; Diabetes in her paternal aunt and paternal aunt; No Known Problems in her daughter, maternal aunt, and maternal aunt; Prostate Cancer in her paternal uncle.    SOCIAL HISTORY:   reports that she has never smoked. She has never been exposed to tobacco smoke. She has never used smokeless tobacco. She reports that she does not drink alcohol and does not use drugs.    VITALS:  Vitals:    11/06/24 0735   BP: (!) 167/101   Pulse: 84   Resp: 19   Temp: 97.3 °F (36.3 °C)   TempSrc: Infrared   SpO2: 95%   Weight: 102.9 kg (226 lb 12.8 oz)   Height: 1.626 m (5' 4\")                                                                                                                           PHYSICAL EXAM:     Unchanged from Prior H&P  General appearance: Well-developed, well-nourished, in no acute distress. Pleasant, alert and oriented to person place and time  Head/face: Normocephalic atraumatic, normal facial strength, no sinus tenderness, and no scars  Eyes: Extraocular movement intact

## 2024-11-04 NOTE — BRIEF OP NOTE
Brief Operative Note  Department of Obstetrics and Gynecology  [facility]     Patient: Agnes Jerome   : 1955  MRN: [id number]       Acct: [id number]   Date of Procedure: 24     Pre-operative Diagnosis: 68 y.o. female    Lichen sclerosis  High grade squamous intraepithelial neoplasia  BMI 38     Post-operative Diagnosis: same as above   Lichen sclerosis  High grade squamous intraepithelial neoplasia  BMI 38     Procedure: Colposcopy with vaginal and vulva biopsies      Surgeon: Dr. Salazar     Assistant(s): Nisreen Mccoy MD, PGY4; Dariela Redd, PGY2    Anesthesia: MAC    Findings:  Grossly normal appearing external genitalia prior to acetic acid application. Vaginal cuff showed acetowhite changes with friable appearance predominantly over anterior vaginal cuff. Grade 2 cystocele with acetowhite changes present on the anterior vaginal region. Bilateral acetowhite changes over labia minor  Total IV fluids/Blood products:  none  Urine Output:  voided preop  Estimated blood loss:  5 mL  Drains:  none  Specimens:  anterior vaginal cuff, anterior vaginal mucosa, bilateral vaginal introitus mucosa  Instrument and Sponge Count: Correct  Complications:  none  Condition:  good, transferred to post anesthesia recovery    Nisreen Mccoy MD  Ob/Gyn Resident  2024, 10:17 AM

## 2024-11-04 NOTE — DISCHARGE INSTRUCTIONS
[facility] UROGYNECOLOGY & PELVIC REHABILITATION     POSTOPERATIVE PATIENT INSTRUCTIONS  MAJOR & MINOR SURGICAL PROCEDURES      Congratulations! You have taken the brave step of undergoing surgery in order to try to improve your health.  Now it is time to focus on healing outside of the hospital / surgery center setting.  To make your dismissal as successful as possible, it is recommended that you thoroughly review these postoperative instructions. These instructions pertain to, but are not limited to the following procedures:      MAJOR   Hysterectomy - Vaginal / Laparoscopic / Robotic   With or Without tube-ovarian Removal (Salpingo-oophorectomy)   Sacrocolpopexy / Sacroperineopexy / Sacrocervicopexy/ Hysteropexy (lifting apex to sacrum)  Major Vaginal Prolapse repairs   Cystocele repair (Anterior Colporrhaphy)   Rectocele repair (Posterior Colporrhaphy)   Enterocele repair    Vaginal vault repair   Closing or removal of the vagina (Colpocleisis / Colpectomy)   Major urinary incontinence surgery (Joyner Urethropexy, MMK)   Major fibroid removal (Myomectomy)   Major tubal surgery (re-anastomosis, ectopic pregnancy, pelvic inflammatory disease)   Major surgery for adhesions or endometriosis involving bowel   Major vaginal mesh removal    Fistula repair of the bladder or colorectum to the vagina   Creation of a new vagina (Neovaginoplasty) or repair of a Mullerian Anomaly   Other       MINOR   Hysteroscopy with Dilatation / Curettage, Endometrial Ablation / Hysterosalpingogram (HSG)  Laparoscopy With or Without minor tubal or ovarian surgery   Minor Vaginal Prolapse repairs   Cystocele repair (Anterior Colporrhaphy)   Rectocele repair (Posterior Colporrhaphy)   Urethrocele repair    Minor urinary incontinence surgery (Slings, Transurethral Bulking)   Minor vaginal surgery (Mesh removal, Laser ablation, Biopsies, Labial revisions, Injections)    Minor surgery of the bladder (DMSO, Hydrodistention, Botox, etc.)  causing a leak or a blockage      When Do I Need to Call the Doctor?   Signs of infection - these include a fever of 100.4F (38C) or higher, chills, stomach pain.   Signs of wound infection - these include swelling, redness, warmth around the catheter site, too much pain when touched, yellowish/greenish or bloody discharge, foul smell coming from the site.   Cloudy or foul-smelling urine.   Blood in urine.   Stones or sediment in the tubing or drainage bag.   Soreness near the tube.   Very bad bladder pain or spasms.   Urine leaking around the tube.   No urine flowing into the bag; this could be a sign that the tube is clogged.   Tubing comes out.      Helpful Tips to Lower Risk of Catheter Related Urinary Tract Infections:   Always wash your hands before and after you care for your catheter.   Check your catheter often to be sure it is in the right position and secure to avoid leakage at the stoma.   Keep the urine drain bag below the level of your bladder.   Make sure to keep the tubing free of kinks so the urine flows freely.   Avoid wearing tight-fitting clothing over the tubing that could block the flow of urine.   Do not re-use single use drainage bags.   Drink lots of liquids each day; avoid caffeine drinks and beer, wine, and mixed drinks (alcohol) which may bother the bladder.   Eat lots of fiber each day to avoid hard stools.  This will help to avoid a blockage and bladder pressure.      16    RECORD FOR VOIDED AND POSTVOID AMOUNTS      Please record the time you void, the amount voided, and the amount left in your bladder after you empty it with your catheter.         Date/Time     Voided Amount Post Void Residual Amount       Date/Time     Voided Amount  Post Void Residual Amount

## 2024-11-05 ENCOUNTER — TELEPHONE (OUTPATIENT)
Dept: SURGERY | Age: 69
End: 2024-11-05

## 2024-11-05 ENCOUNTER — ANESTHESIA EVENT (OUTPATIENT)
Dept: OPERATING ROOM | Age: 69
End: 2024-11-05
Payer: MEDICARE

## 2024-11-05 LAB
EKG ATRIAL RATE: 77 BPM
EKG P AXIS: 66 DEGREES
EKG P-R INTERVAL: 172 MS
EKG Q-T INTERVAL: 392 MS
EKG QRS DURATION: 96 MS
EKG QTC CALCULATION (BAZETT): 443 MS
EKG R AXIS: 28 DEGREES
EKG T AXIS: 52 DEGREES
EKG VENTRICULAR RATE: 77 BPM

## 2024-11-06 ENCOUNTER — ANESTHESIA (OUTPATIENT)
Dept: OPERATING ROOM | Age: 69
End: 2024-11-06
Payer: MEDICARE

## 2024-11-06 ENCOUNTER — HOSPITAL ENCOUNTER (OUTPATIENT)
Age: 69
Setting detail: OUTPATIENT SURGERY
Discharge: HOME | End: 2024-11-06
Attending: OBSTETRICS & GYNECOLOGY | Admitting: OBSTETRICS & GYNECOLOGY
Payer: MEDICARE

## 2024-11-06 ENCOUNTER — PREP FOR PROCEDURE (OUTPATIENT)
Dept: SURGERY | Age: 69
End: 2024-11-06

## 2024-11-06 ENCOUNTER — TELEPHONE (OUTPATIENT)
Dept: SURGERY | Age: 69
End: 2024-11-06

## 2024-11-06 VITALS
RESPIRATION RATE: 17 BRPM | HEART RATE: 74 BPM | SYSTOLIC BLOOD PRESSURE: 129 MMHG | BODY MASS INDEX: 38.72 KG/M2 | TEMPERATURE: 97.2 F | WEIGHT: 226.8 LBS | DIASTOLIC BLOOD PRESSURE: 85 MMHG | HEIGHT: 64 IN | OXYGEN SATURATION: 92 %

## 2024-11-06 DIAGNOSIS — Z86.0100 HISTORY OF COLON POLYPS: ICD-10-CM

## 2024-11-06 DIAGNOSIS — L90.0 LICHEN SCLEROSUS: ICD-10-CM

## 2024-11-06 DIAGNOSIS — Z80.0 FAMILY HISTORY OF COLON CANCER: ICD-10-CM

## 2024-11-06 DIAGNOSIS — R87.613 HSIL (HIGH GRADE SQUAMOUS INTRAEPITHELIAL LESION) ON PAP SMEAR OF CERVIX: ICD-10-CM

## 2024-11-06 PROCEDURE — 6360000002 HC RX W HCPCS: Performed by: ANESTHESIOLOGY

## 2024-11-06 PROCEDURE — 6360000002 HC RX W HCPCS: Performed by: NURSE ANESTHETIST, CERTIFIED REGISTERED

## 2024-11-06 PROCEDURE — 7100000010 HC PHASE II RECOVERY - FIRST 15 MIN: Performed by: OBSTETRICS & GYNECOLOGY

## 2024-11-06 PROCEDURE — 3700000001 HC ADD 15 MINUTES (ANESTHESIA): Performed by: OBSTETRICS & GYNECOLOGY

## 2024-11-06 PROCEDURE — 3600000003 HC SURGERY LEVEL 3 BASE: Performed by: OBSTETRICS & GYNECOLOGY

## 2024-11-06 PROCEDURE — 88305 TISSUE EXAM BY PATHOLOGIST: CPT

## 2024-11-06 PROCEDURE — 2709999900 HC NON-CHARGEABLE SUPPLY: Performed by: OBSTETRICS & GYNECOLOGY

## 2024-11-06 PROCEDURE — 7100000011 HC PHASE II RECOVERY - ADDTL 15 MIN: Performed by: OBSTETRICS & GYNECOLOGY

## 2024-11-06 PROCEDURE — 3600000013 HC SURGERY LEVEL 3 ADDTL 15MIN: Performed by: OBSTETRICS & GYNECOLOGY

## 2024-11-06 PROCEDURE — 6370000000 HC RX 637 (ALT 250 FOR IP): Performed by: OBSTETRICS & GYNECOLOGY

## 2024-11-06 PROCEDURE — 2500000003 HC RX 250 WO HCPCS: Performed by: NURSE ANESTHETIST, CERTIFIED REGISTERED

## 2024-11-06 PROCEDURE — 88342 IMHCHEM/IMCYTCHM 1ST ANTB: CPT

## 2024-11-06 PROCEDURE — 3700000000 HC ANESTHESIA ATTENDED CARE: Performed by: OBSTETRICS & GYNECOLOGY

## 2024-11-06 PROCEDURE — 57100 BIOPSY VAGINAL MUCOSA SIMPLE: CPT | Performed by: OBSTETRICS & GYNECOLOGY

## 2024-11-06 RX ORDER — FERRIC SUBSULFATE 0.21 G/G
LIQUID TOPICAL PRN
Status: DISCONTINUED | OUTPATIENT
Start: 2024-11-06 | End: 2024-11-06 | Stop reason: ALTCHOICE

## 2024-11-06 RX ORDER — OXYCODONE HYDROCHLORIDE 5 MG/1
5 TABLET ORAL PRN
Status: DISCONTINUED | OUTPATIENT
Start: 2024-11-06 | End: 2024-11-06 | Stop reason: HOSPADM

## 2024-11-06 RX ORDER — GINSENG 100 MG
CAPSULE ORAL
Status: DISCONTINUED
Start: 2024-11-06 | End: 2024-11-06 | Stop reason: HOSPADM

## 2024-11-06 RX ORDER — ONDANSETRON 2 MG/ML
4 INJECTION INTRAMUSCULAR; INTRAVENOUS
Status: DISCONTINUED | OUTPATIENT
Start: 2024-11-06 | End: 2024-11-06 | Stop reason: HOSPADM

## 2024-11-06 RX ORDER — HYDRALAZINE HYDROCHLORIDE 20 MG/ML
10 INJECTION INTRAMUSCULAR; INTRAVENOUS
Status: DISCONTINUED | OUTPATIENT
Start: 2024-11-06 | End: 2024-11-06 | Stop reason: HOSPADM

## 2024-11-06 RX ORDER — DIPHENHYDRAMINE HYDROCHLORIDE 50 MG/ML
12.5 INJECTION INTRAMUSCULAR; INTRAVENOUS
Status: DISCONTINUED | OUTPATIENT
Start: 2024-11-06 | End: 2024-11-06 | Stop reason: HOSPADM

## 2024-11-06 RX ORDER — MORPHINE SULFATE 2 MG/ML
1 INJECTION, SOLUTION INTRAMUSCULAR; INTRAVENOUS EVERY 5 MIN PRN
Status: DISCONTINUED | OUTPATIENT
Start: 2024-11-06 | End: 2024-11-06 | Stop reason: HOSPADM

## 2024-11-06 RX ORDER — SODIUM CHLORIDE 9 MG/ML
INJECTION, SOLUTION INTRAVENOUS CONTINUOUS
Status: DISCONTINUED | OUTPATIENT
Start: 2024-11-06 | End: 2024-11-06 | Stop reason: HOSPADM

## 2024-11-06 RX ORDER — SODIUM CHLORIDE 0.9 % (FLUSH) 0.9 %
5-40 SYRINGE (ML) INJECTION PRN
Status: DISCONTINUED | OUTPATIENT
Start: 2024-11-06 | End: 2024-11-06 | Stop reason: HOSPADM

## 2024-11-06 RX ORDER — MIDAZOLAM HYDROCHLORIDE 1 MG/ML
INJECTION, SOLUTION INTRAMUSCULAR; INTRAVENOUS
Status: DISCONTINUED | OUTPATIENT
Start: 2024-11-06 | End: 2024-11-06 | Stop reason: SDUPTHER

## 2024-11-06 RX ORDER — KETOROLAC TROMETHAMINE 30 MG/ML
INJECTION, SOLUTION INTRAMUSCULAR; INTRAVENOUS
Status: DISCONTINUED | OUTPATIENT
Start: 2024-11-06 | End: 2024-11-06 | Stop reason: SDUPTHER

## 2024-11-06 RX ORDER — LIDOCAINE HYDROCHLORIDE 10 MG/ML
INJECTION, SOLUTION EPIDURAL; INFILTRATION; INTRACAUDAL; PERINEURAL
Status: DISCONTINUED | OUTPATIENT
Start: 2024-11-06 | End: 2024-11-06 | Stop reason: SDUPTHER

## 2024-11-06 RX ORDER — SODIUM CHLORIDE 9 MG/ML
INJECTION, SOLUTION INTRAVENOUS PRN
Status: DISCONTINUED | OUTPATIENT
Start: 2024-11-06 | End: 2024-11-06 | Stop reason: HOSPADM

## 2024-11-06 RX ORDER — IODINE SOLUTION STRONG 5% (LUGOL'S) 5 %
SOLUTION ORAL
Status: DISCONTINUED
Start: 2024-11-06 | End: 2024-11-06 | Stop reason: WASHOUT

## 2024-11-06 RX ORDER — PROPOFOL 10 MG/ML
INJECTION, EMULSION INTRAVENOUS
Status: DISCONTINUED | OUTPATIENT
Start: 2024-11-06 | End: 2024-11-06 | Stop reason: SDUPTHER

## 2024-11-06 RX ORDER — NALOXONE HYDROCHLORIDE 0.4 MG/ML
INJECTION, SOLUTION INTRAMUSCULAR; INTRAVENOUS; SUBCUTANEOUS PRN
Status: DISCONTINUED | OUTPATIENT
Start: 2024-11-06 | End: 2024-11-06 | Stop reason: HOSPADM

## 2024-11-06 RX ORDER — METOCLOPRAMIDE HYDROCHLORIDE 5 MG/ML
10 INJECTION INTRAMUSCULAR; INTRAVENOUS
Status: DISCONTINUED | OUTPATIENT
Start: 2024-11-06 | End: 2024-11-06 | Stop reason: HOSPADM

## 2024-11-06 RX ORDER — SODIUM CHLORIDE 0.9 % (FLUSH) 0.9 %
5-40 SYRINGE (ML) INJECTION EVERY 12 HOURS SCHEDULED
Status: DISCONTINUED | OUTPATIENT
Start: 2024-11-06 | End: 2024-11-06 | Stop reason: HOSPADM

## 2024-11-06 RX ORDER — MEPERIDINE HYDROCHLORIDE 50 MG/ML
12.5 INJECTION INTRAMUSCULAR; INTRAVENOUS; SUBCUTANEOUS ONCE
Status: DISCONTINUED | OUTPATIENT
Start: 2024-11-06 | End: 2024-11-06 | Stop reason: HOSPADM

## 2024-11-06 RX ORDER — ACETIC ACID 5 %
LIQUID (ML) MISCELLANEOUS PRN
Status: DISCONTINUED | OUTPATIENT
Start: 2024-11-06 | End: 2024-11-06 | Stop reason: ALTCHOICE

## 2024-11-06 RX ORDER — ONDANSETRON 2 MG/ML
INJECTION INTRAMUSCULAR; INTRAVENOUS
Status: DISCONTINUED | OUTPATIENT
Start: 2024-11-06 | End: 2024-11-06 | Stop reason: SDUPTHER

## 2024-11-06 RX ORDER — OXYCODONE HYDROCHLORIDE 5 MG/1
10 TABLET ORAL PRN
Status: DISCONTINUED | OUTPATIENT
Start: 2024-11-06 | End: 2024-11-06 | Stop reason: HOSPADM

## 2024-11-06 RX ORDER — GLYCOPYRROLATE 0.2 MG/ML
INJECTION INTRAMUSCULAR; INTRAVENOUS
Status: DISCONTINUED | OUTPATIENT
Start: 2024-11-06 | End: 2024-11-06 | Stop reason: SDUPTHER

## 2024-11-06 RX ORDER — SODIUM CHLORIDE, SODIUM LACTATE, POTASSIUM CHLORIDE, CALCIUM CHLORIDE 600; 310; 30; 20 MG/100ML; MG/100ML; MG/100ML; MG/100ML
INJECTION, SOLUTION INTRAVENOUS CONTINUOUS
Status: DISCONTINUED | OUTPATIENT
Start: 2024-11-06 | End: 2024-11-06 | Stop reason: HOSPADM

## 2024-11-06 RX ORDER — LABETALOL HYDROCHLORIDE 5 MG/ML
10 INJECTION, SOLUTION INTRAVENOUS
Status: DISCONTINUED | OUTPATIENT
Start: 2024-11-06 | End: 2024-11-06 | Stop reason: HOSPADM

## 2024-11-06 RX ORDER — MIDAZOLAM HYDROCHLORIDE 2 MG/2ML
2 INJECTION, SOLUTION INTRAMUSCULAR; INTRAVENOUS
Status: DISCONTINUED | OUTPATIENT
Start: 2024-11-06 | End: 2024-11-06 | Stop reason: HOSPADM

## 2024-11-06 RX ORDER — DEXAMETHASONE SODIUM PHOSPHATE 10 MG/ML
INJECTION, SOLUTION INTRAMUSCULAR; INTRAVENOUS
Status: DISCONTINUED | OUTPATIENT
Start: 2024-11-06 | End: 2024-11-06 | Stop reason: SDUPTHER

## 2024-11-06 RX ADMIN — DEXAMETHASONE SODIUM PHOSPHATE 10 MG: 10 INJECTION, SOLUTION INTRAMUSCULAR; INTRAVENOUS at 09:42

## 2024-11-06 RX ADMIN — GLYCOPYRROLATE 0.4 MG: 0.2 INJECTION INTRAMUSCULAR; INTRAVENOUS at 09:41

## 2024-11-06 RX ADMIN — PROPOFOL 125 MCG/KG/MIN: 10 INJECTION, EMULSION INTRAVENOUS at 09:41

## 2024-11-06 RX ADMIN — KETOROLAC TROMETHAMINE 30 MG: 30 INJECTION, SOLUTION INTRAMUSCULAR at 09:42

## 2024-11-06 RX ADMIN — PROPOFOL 100 MG: 10 INJECTION, EMULSION INTRAVENOUS at 09:41

## 2024-11-06 RX ADMIN — HYDROMORPHONE HYDROCHLORIDE 0.5 MG: 1 INJECTION, SOLUTION INTRAMUSCULAR; INTRAVENOUS; SUBCUTANEOUS at 10:47

## 2024-11-06 RX ADMIN — MIDAZOLAM 2 MG: 1 INJECTION INTRAMUSCULAR; INTRAVENOUS at 09:37

## 2024-11-06 RX ADMIN — ONDANSETRON 4 MG: 2 INJECTION INTRAMUSCULAR; INTRAVENOUS at 09:42

## 2024-11-06 RX ADMIN — LIDOCAINE HYDROCHLORIDE 50 MG: 10 INJECTION, SOLUTION EPIDURAL; INFILTRATION; INTRACAUDAL; PERINEURAL at 09:41

## 2024-11-06 ASSESSMENT — PAIN DESCRIPTION - DESCRIPTORS
DESCRIPTORS: BURNING;TINGLING
DESCRIPTORS: ACHING

## 2024-11-06 ASSESSMENT — PAIN - FUNCTIONAL ASSESSMENT
PAIN_FUNCTIONAL_ASSESSMENT: 0-10
PAIN_FUNCTIONAL_ASSESSMENT: NONE - DENIES PAIN

## 2024-11-06 ASSESSMENT — PAIN DESCRIPTION - LOCATION: LOCATION: VAGINA

## 2024-11-06 ASSESSMENT — PAIN SCALES - GENERAL
PAINLEVEL_OUTOF10: 7
PAINLEVEL_OUTOF10: 2

## 2024-11-06 NOTE — OP NOTE
Operative Note      Patient: Agnes Jerome  YOB: 1955  MRN: [id number]    Date of Procedure: 11/6/2024    Pre-Op Diagnosis Codes:      * Lichen sclerosus [L90.0]     * HSIL (high grade squamous intraepithelial lesion) on Pap smear of cervix [R87.613]    Post-Op Diagnosis: Same       Procedure(s):  VAGINAL COLPOSCOPY WITH VULVARVAGINAL BIOPSIES    Surgeon(s):  Michael Salazar DO    Assistant:   Resident: Nisreen Mccoy MD; Dariela Redd DO    Anesthesia: Monitor Anesthesia Care    Estimated Blood Loss (mL): Minimal    Complications: None    Specimens:   ID Type Source Tests Collected by Time Destination   A : anterior vaginal cuff Tissue Tissue SURGICAL PATHOLOGY Michael Salazar,  11/6/2024 0943    B : low anterior vagina Tissue Tissue SURGICAL PATHOLOGY Michael Salazar,  11/6/2024 0951    C : right vulva Tissue Tissue SURGICAL PATHOLOGY Michael Salazar,  11/6/2024 0954    D : left vulva Tissue Tissue SURGICAL PATHOLOGY Michael Salazar,  11/6/2024 1000        Implants:  * No implants in log *      Drains: * No LDAs found *    Findings:  Infection Present At Time Of Surgery (PATOS) (choose all levels that have infection present):  No infection present  Other Findings: none    Detailed Description of Procedure:   The patient is being admitted for a planned elective surgical procedure today. The patient has symptoms, physical findings, and diagnostic testing meeting appropriate indications for today's planned procedure. The patient has been educated about their condition, conservative and surgical options have been offered to the patient, and the patient has decided to proceed with a surgical option. In the preoperative holding area prior to the procedure, the operative plan, including risks, benefits and alternatives was reviewed with the patient and any present family member and friend.  An informed consent has been signed under no duress or confusion. The patient has been surgically  cleared by her PCP and /or any pertinent specialists as recommended. All pertinent preoperative labs and testing have been reviewed. A pregnancy test was confirmed as negative if of reproductive age with an intact uterus. The patient has satisfactorily completed recommended pre-operative preparations prior to surgery, including MRSA precautions as indicated. The patient took required medicines and nutritional supplements to the best of their ability prior to surgery with a sip of water, but otherwise has taken nothing by mouth. The patient has discontinued any blood thinners as required to proceed with surgery. The patient denies chest pain, shortness of breath, calf pain, or open sores on the day of surgery. All dentures and body jewelry have been removed and / or taped. Pertinent body sites have been marked.    The patient was taken back to the operating room and transferred to the operative table.  A surgical timeout was taken, which included the patient's name, ALLERGIES, planned surgical procedure, and standard safety parameters.  Per anesthesia, the appropriate induction was successfully completed for the patient's projected procedure.  The patient was then placed in the appropriate operative lithotomy position, which was confirmed by me to be neutrally positioned and free of any pressure points or unusual strain on the joints or limbs.  For significant musculoskeletal limitations, the patient was placed in the operative position prior to induction of anesthesia to confirm no pain on the areas of concern. The patient's face was protected per anesthesia.  The patient was draped sterilely for the procedure.  Surgical equipment was checked before acknowledging the start of the procedure. The bladder was drained with in and out catheterization as necessary.  The external and internal genitalia were then inspected in a stepwise manner.  A speculum was inserted to visualize the cervix and/or the vagina.  A

## 2024-11-06 NOTE — TELEPHONE ENCOUNTER
Instructions reviewed over the phone and mailed to the patient. All questions answered and patient denies any further questions at this time. Patient scheduled for colonoscopy on 1-21-25 at [facility] with Dr. Hood. Instructed patient she can purchase the Miralax/Gatorade bowel prep over the counter at her pharmacy.   
Determinants of Health     Financial Resource Strain: Low Risk  (2024)    Overall Financial Resource Strain (CARDIA)     Difficulty of Paying Living Expenses: Not very hard   Food Insecurity: No Food Insecurity (2024)    Hunger Vital Sign     Worried About Running Out of Food in the Last Year: Never true     Ran Out of Food in the Last Year: Never true   Transportation Needs: Unknown (2024)    PRAPARE - Transportation     Lack of Transportation (Medical): Not on file     Lack of Transportation (Non-Medical): No   Physical Activity: Insufficiently Active (2024)    Exercise Vital Sign     Days of Exercise per Week: 2 days     Minutes of Exercise per Session: 30 min   Stress: Not on file   Social Connections: Not on file   Intimate Partner Violence: Not on file   Housing Stability: Unknown (2024)    Housing Stability Vital Sign     Unable to Pay for Housing in the Last Year: Not on file     Number of Places Lived in the Last Year: Not on file     Unstable Housing in the Last Year: No     Past Surgical History:   Procedure Laterality Date    BLADDER SUSPENSION      Dr Salazar    BREAST BIOPSY Left     BENIGN    CERVIX BIOPSY  1999    Laser cone due to HSIL     SECTION      CHOLECYSTECTOMY      COLONOSCOPY  10/17/2016    hyperplastic  polyp, Dr. Hood    COLONOSCOPY N/A 2021    Tubular adenoma X 2. Hyperplastic X 1. Dr. Hood at [facility]    HYSTERECTOMY, VAGINAL  2014    Dr Michael Salazar; VH with BSO, external [place] culdoplasty with uterosacral ligament suspension, anterior colprrhapy, cystourethyocopy, posterior colporrhapy    SALPINGO-OOPHORECTOMY Bilateral 2014    Dr Michael Salazar    UPPER GASTROINTESTINAL ENDOSCOPY  10/17/2016    gastritis, superficial ulcer in esophagus, Dr. Hood     Past Medical History:   Diagnosis Date    Allergic rhinitis     [place]-Walker grade 2 cystocele     Breast lump     Gallstones     H/O urinary tract infection     History of chicken pox

## 2024-11-06 NOTE — ANESTHESIA PRE PROCEDURE
Past Medical History:        Diagnosis Date    Allergic rhinitis     [place]-Walker grade 2 cystocele     Breast lump     Gallstones     H/O urinary tract infection     History of chicken pox     HSIL (high grade squamous intraepithelial lesion) on Pap smear of cervix      with HPV noted on path report.  Did have Laser Cone procedure    Lichen sclerosus     Obesity     Rectocele     Retention of urine, unspecified     Urinary incontinence     Vaginal prolapse     Vulvar itching        Past Surgical History:        Procedure Laterality Date    BLADDER SUSPENSION      Dr Salazar    BREAST BIOPSY Left     BENIGN    CERVIX BIOPSY  1999    Laser cone due to HSIL     SECTION      CHOLECYSTECTOMY      COLONOSCOPY  10/17/2016    hyperplastic  polyp, Dr. Hood    COLONOSCOPY N/A 2021    Tubular adenoma X 2. Hyperplastic X 1. Dr. Hood at [facility]    HYSTERECTOMY, VAGINAL  2014    Dr Michael Salazar; VH with BSO, external [facility] culdoplasty with uterosacral ligament suspension, anterior colprrhapy, cystourethyocopy, posterior colporrhapy    SALPINGO-OOPHORECTOMY Bilateral 2014    Dr Michael Salazar    UPPER GASTROINTESTINAL ENDOSCOPY  10/17/2016    gastritis, superficial ulcer in esophagus, Dr. Hood       Social History:    Social History     Tobacco Use    Smoking status: Never     Passive exposure: Never    Smokeless tobacco: Never   Substance Use Topics    Alcohol use: No                                Counseling given: Not Answered      Vital Signs (Current):   Vitals:    24 0735   BP: (!) 167/101   Pulse: 84   Resp: 19   Temp: 97.3 °F (36.3 °C)   TempSrc: Infrared   SpO2: 95%   Weight: 102.9 kg (226 lb 12.8 oz)   Height: 1.626 m (5' 4\")                                              BP Readings from Last 3 Encounters:   24 (!) 167/101   10/29/24 (!) 140/80   24 138/84       NPO Status: Time of last liquid consumption: 1900                        Time of last solid

## 2024-11-06 NOTE — ANESTHESIA POSTPROCEDURE EVALUATION
Department of Anesthesiology  Postprocedure Note    Patient: Agnes Jerome  MRN: [id number]  YOB: 1955  Date of evaluation: 11/6/2024    Procedure Summary       Date: 11/06/24 Room / Location: [address]    Anesthesia Start: 0937 Anesthesia Stop: 1020    Procedure: VAGINAL COLPOSCOPY WITH VULVARVAGINAL BIOPSIES Diagnosis:       Lichen sclerosus      HSIL (high grade squamous intraepithelial lesion) on Pap smear of cervix      (Lichen sclerosus [L90.0])      (HSIL (high grade squamous intraepithelial lesion) on Pap smear of cervix [R87.613])    Surgeons: Michael Salazar DO Responsible Provider: Cyndy Eason MD    Anesthesia Type: MAC ASA Status: 2            Anesthesia Type: No value filed.    Rylee Phase I: Rylee Score: 10    Rylee Phase II: Rylee Score: 9    Anesthesia Post Evaluation    Patient location during evaluation: PACU  Patient participation: complete - patient participated  Level of consciousness: awake and alert  Airway patency: patent  Nausea & Vomiting: no nausea and no vomiting  Cardiovascular status: blood pressure returned to baseline  Respiratory status: acceptable and room air  Hydration status: euvolemic  Pain management: adequate and satisfactory to patient    No notable events documented.

## 2024-11-08 ENCOUNTER — TELEPHONE (OUTPATIENT)
Age: 69
End: 2024-11-08

## 2024-11-08 LAB — SURGICAL PATHOLOGY REPORT: NORMAL

## 2024-11-08 NOTE — TELEPHONE ENCOUNTER
Date:  2024   Time:  12:26 PM   Patient:  Agnes Jerome   :  1955   Procedure:   VAGINAL COLPOSCOPY WITH VULVARVAGINAL BIOPSIES     Procedure Date: 2024   Post-Op visit Scheduled for follow up? 2024     Comments: Left detailed message informing patient of post op appt time/date and how to reach the on call

## 2024-11-13 ENCOUNTER — OFFICE VISIT (OUTPATIENT)
Age: 69
End: 2024-11-13
Payer: MEDICARE

## 2024-11-13 ENCOUNTER — HOSPITAL ENCOUNTER (OUTPATIENT)
Age: 69
Setting detail: SPECIMEN
Discharge: HOME | End: 2024-11-13

## 2024-11-13 VITALS
HEART RATE: 69 BPM | OXYGEN SATURATION: 95 % | SYSTOLIC BLOOD PRESSURE: 140 MMHG | BODY MASS INDEX: 38.62 KG/M2 | WEIGHT: 225 LBS | TEMPERATURE: 97.9 F | DIASTOLIC BLOOD PRESSURE: 87 MMHG

## 2024-11-13 DIAGNOSIS — L90.0 LICHEN SCLEROSUS: ICD-10-CM

## 2024-11-13 DIAGNOSIS — N30.00 ACUTE CYSTITIS WITHOUT HEMATURIA: ICD-10-CM

## 2024-11-13 DIAGNOSIS — R30.0 BURNING WITH URINATION: Primary | ICD-10-CM

## 2024-11-13 DIAGNOSIS — N95.2 ATROPHIC VULVOVAGINITIS: ICD-10-CM

## 2024-11-13 LAB
BILIRUBIN, POC: ABNORMAL
BLOOD URINE, POC: ABNORMAL
CLARITY, POC: ABNORMAL
COLOR, POC: ABNORMAL
GLUCOSE URINE, POC: ABNORMAL MG/DL
KETONES, POC: ABNORMAL MG/DL
LEUKOCYTE EST, POC: 500
NITRITE, POC: POSITIVE
PH, POC: 5
PROTEIN, POC: ABNORMAL MG/DL
SPECIFIC GRAVITY, POC: 1.02
UROBILINOGEN, POC: ABNORMAL MG/DL

## 2024-11-13 PROCEDURE — 81003 URINALYSIS AUTO W/O SCOPE: CPT | Performed by: NURSE PRACTITIONER

## 2024-11-13 PROCEDURE — 99024 POSTOP FOLLOW-UP VISIT: CPT | Performed by: NURSE PRACTITIONER

## 2024-11-13 RX ORDER — CLOBETASOL PROPIONATE 0.5 MG/G
CREAM TOPICAL
Qty: 30 G | Refills: 1 | Status: SHIPPED | OUTPATIENT
Start: 2024-11-13

## 2024-11-13 RX ORDER — ESTRADIOL 0.1 MG/G
1 CREAM VAGINAL
Qty: 42 G | Refills: 0 | Status: SHIPPED | OUTPATIENT
Start: 2024-11-14

## 2024-11-13 RX ORDER — NITROFURANTOIN 25; 75 MG/1; MG/1
100 CAPSULE ORAL 2 TIMES DAILY
Qty: 20 CAPSULE | Refills: 0 | Status: SHIPPED | OUTPATIENT
Start: 2024-11-13 | End: 2024-11-23

## 2024-11-13 NOTE — PROGRESS NOTES
[facility], [facility] UROGYNECOLOGY AND PELVIC REHABILITATION   [address] [place] [address]  Dept: [phone]   Patient:  Agnes Bruno   :  1955   Visit Date:  2024     CC: 1-2 week postoperative exam. had concerns including Post-Op Check (VAGINAL COLPOSCOPY WITH VULVARVAGINAL BIOPSIES-Pt feeling sore, hurts to urinate, small amount of bleeding ).    Chaperone present for entire visit and pertinent physical exam: None Required    HPI: Pt has noted dysuria over past few days, not getting worse. No increased urgency/frequency.    Status post: Vaginal Colposcopy With Vulvarvaginal Biopsies   Date: 2024     SIGNIFICANT FINDINGS:   Surgical Pathology Report   Date Value Ref Range Status   2024       Path Number: [id number]    -- Diagnosis --  A.  Anterior vaginal cuff, biopsy:  Fibromuscular tissue with surface chronic inflammation and complete  mucosal erosion.  No dysplasia seen.    B.  Vagina, low anterior, biopsy:  Lichen sclerosis et atrophicus with epithelial erosion.  No dysplasia seen.    C.  Vulva, right, biopsy:  Squamous atrophy with keratosis.  No dysplasia seen.    D.  Vulva, left, biopsy:  Lichen sclerosis et atrophicus.  No dysplasia seen.      ZARA Ritter.  **Electronically Signed Out**              Clinical Information  Pre-op Diagnosis:  LICHEN SCLEROSUS, HSIL ON PAP SMEAR OF CERVIX   Operative Findings:  ANTERIOR VAGINAL CUFF; LOW ANTERIOR VAGINA, RIGHT  VULVA; LEFT VULVA  Operation Performed:  VAGINAL COLPOSCOPY WITH VULVAR VAGINAL BIOPSIES   tm         Source of Specimen  A: ANTERIOR VAGINAL CUFF  B: LOW ANTERIOR VAGINA  C: RIGHT VULVA  D: LEFT VULVA      Gross Description  A.  \"AGNES BURNO, ANTERIOR VAGINAL CUFF\" Received in formalin are two  tan fragments, 0.3 x 0.3 x 0.2 cm each.  Intact, 1cs.   B.  \"AGNES BRUNO, LOW ANTERIOR VAGINA\" Received in formalin are two  tan

## 2024-11-15 LAB
MICROORGANISM SPEC CULT: ABNORMAL
SERVICE CMNT-IMP: ABNORMAL
SPECIMEN DESCRIPTION: ABNORMAL

## 2024-11-26 ENCOUNTER — OFFICE VISIT (OUTPATIENT)
Dept: PRIMARY CARE CLINIC | Age: 69
End: 2024-11-26
Payer: MEDICARE

## 2024-11-26 VITALS
TEMPERATURE: 99.2 F | SYSTOLIC BLOOD PRESSURE: 132 MMHG | BODY MASS INDEX: 38.62 KG/M2 | OXYGEN SATURATION: 100 % | HEART RATE: 96 BPM | RESPIRATION RATE: 20 BRPM | WEIGHT: 225 LBS | DIASTOLIC BLOOD PRESSURE: 70 MMHG

## 2024-11-26 DIAGNOSIS — J03.90 TONSILLITIS: Primary | ICD-10-CM

## 2024-11-26 DIAGNOSIS — J02.9 SORE THROAT: ICD-10-CM

## 2024-11-26 LAB
INFLUENZA A ANTIGEN, POC: NEGATIVE
INFLUENZA B ANTIGEN, POC: NEGATIVE
LOT EXPIRE DATE: NORMAL
LOT KIT NUMBER: NORMAL
SARS-COV-2, POC: NORMAL
VALID INTERNAL CONTROL: NORMAL
VENDOR AND KIT NAME POC: NORMAL

## 2024-11-26 PROCEDURE — 99212 OFFICE O/P EST SF 10 MIN: CPT | Performed by: FAMILY MEDICINE

## 2024-11-26 PROCEDURE — 87428 SARSCOV & INF VIR A&B AG IA: CPT | Performed by: FAMILY MEDICINE

## 2024-11-26 RX ORDER — ONDANSETRON 4 MG/1
4 TABLET, ORALLY DISINTEGRATING ORAL 3 TIMES DAILY PRN
Qty: 30 TABLET | Refills: 0 | Status: SHIPPED | OUTPATIENT
Start: 2024-11-26

## 2024-11-26 RX ADMIN — PENICILLIN G BENZATHINE 1.2 MILLION UNITS: 1200000 INJECTION, SUSPENSION INTRAMUSCULAR at 20:05

## 2024-11-26 ASSESSMENT — ENCOUNTER SYMPTOMS
EYE REDNESS: 0
SHORTNESS OF BREATH: 0
ABDOMINAL PAIN: 0
DIARRHEA: 0
TROUBLE SWALLOWING: 0
WHEEZING: 0
CONSTIPATION: 0
RHINORRHEA: 0
SORE THROAT: 1
VOMITING: 0
SINUS PAIN: 0
COUGH: 1
NAUSEA: 0
SINUS PRESSURE: 0
EYE DISCHARGE: 0

## 2024-11-27 NOTE — PROGRESS NOTES
2024     Agnes Jerome (:  1955) is a 68 y.o. female, here for evaluation of the following medical concerns:    Cough  This is a new problem. The current episode started yesterday. The problem has been gradually worsening. The cough is Non-productive. Associated symptoms include chills, a fever, myalgias and a sore throat. Pertinent negatives include no chest pain, ear pain, eye redness, headaches, nasal congestion, postnasal drip, rash, rhinorrhea, shortness of breath or wheezing. Associated symptoms comments: vomiting. Treatments tried: tylenol ibuprofen, zofran. The treatment provided moderate relief. There is no history of environmental allergies.     Did review patient's med list, allergies, social history,pmhx and pshx today as noted in the record.      Review of Systems   Constitutional:  Positive for chills, fatigue and fever.   HENT:  Positive for sore throat. Negative for congestion, ear pain, postnasal drip, rhinorrhea, sinus pressure, sinus pain and trouble swallowing.    Eyes:  Negative for discharge and redness.   Respiratory:  Positive for cough. Negative for shortness of breath and wheezing.    Cardiovascular:  Negative for chest pain.   Gastrointestinal:  Negative for abdominal pain, constipation, diarrhea, nausea and vomiting.   Genitourinary:  Negative for dysuria, flank pain, frequency and urgency.   Musculoskeletal:  Positive for myalgias. Negative for arthralgias and neck pain.   Skin:  Negative for rash and wound.   Allergic/Immunologic: Negative for environmental allergies.   Neurological:  Negative for dizziness, weakness, light-headedness and headaches.   Hematological:  Negative for adenopathy.   Psychiatric/Behavioral: Negative.         Prior to Visit Medications    Medication Sig Taking? Authorizing Provider   clobetasol (TEMOVATE) 0.05 % cream Apply twice daily externally to the affected area for one month, then once daily for one month, then three times weekly until

## 2024-12-31 ENCOUNTER — TELEPHONE (OUTPATIENT)
Dept: GENERAL RADIOLOGY | Age: 69
End: 2024-12-31

## 2024-12-31 DIAGNOSIS — Z15.01 BRCA2 POSITIVE: ICD-10-CM

## 2024-12-31 DIAGNOSIS — Z15.09 BRCA2 POSITIVE: ICD-10-CM

## 2024-12-31 DIAGNOSIS — Z84.81 FAMILY HISTORY OF BREAST CANCER GENE MUTATION IN FIRST DEGREE RELATIVE: Primary | ICD-10-CM

## 2024-12-31 DIAGNOSIS — Z85.3 PERSONAL HISTORY OF MALIGNANT NEOPLASM OF BREAST: ICD-10-CM

## 2024-12-31 NOTE — TELEPHONE ENCOUNTER
Order for breast MRI was entered in June. Order for creatinine placed under the direction of Dr Leavitt.

## 2024-12-31 NOTE — TELEPHONE ENCOUNTER
Scheduled her yearly breast MRI for 2/7/25 patient aware she needs to do labs prior to testing , please place order. Thank you.

## 2025-01-15 NOTE — H&P
Socioeconomic History    Marital status:      Spouse name: Not on file    Number of children: Not on file    Years of education: Not on file    Highest education level: Not on file   Occupational History    Not on file   Tobacco Use    Smoking status: Never     Passive exposure: Never    Smokeless tobacco: Never   Vaping Use    Vaping status: Never Used   Substance and Sexual Activity    Alcohol use: No    Drug use: No    Sexual activity: Not Currently     Partners: Male   Other Topics Concern    Not on file   Social History Narrative    Not on file     Social Determinants of Health     Financial Resource Strain: Low Risk  (6/2/2024)    Overall Financial Resource Strain (CARDIA)     Difficulty of Paying Living Expenses: Not very hard   Food Insecurity: No Food Insecurity (6/2/2024)    Hunger Vital Sign     Worried About Running Out of Food in the Last Year: Never true     Ran Out of Food in the Last Year: Never true   Transportation Needs: Unknown (6/2/2024)    PRAPARE - Transportation     Lack of Transportation (Medical): Not on file     Lack of Transportation (Non-Medical): No   Physical Activity: Insufficiently Active (6/2/2024)    Exercise Vital Sign     Days of Exercise per Week: 2 days     Minutes of Exercise per Session: 30 min   Stress: Not on file   Social Connections: Not on file   Intimate Partner Violence: Not on file   Housing Stability: Unknown (6/2/2024)    Housing Stability Vital Sign     Unable to Pay for Housing in the Last Year: Not on file     Number of Places Lived in the Last Year: Not on file     Unstable Housing in the Last Year: No       ROS:     PHYSICAL EXAM:    Blood pressure 135/84, pulse 98, temperature 97.3 °F (36.3 °C), temperature source Infrared, resp. rate 16, height 1.626 m (5' 4\"), weight 99 kg (218 lb 3.2 oz), SpO2 95%, not currently breastfeeding.    Gen:  A and O x 3, NAD, well nourished  Eyes:  Sclera non icterus, PERRL  Lungs:  CTA, symmetrical  Chest:  RRR, no

## 2025-01-21 ENCOUNTER — ANESTHESIA EVENT (OUTPATIENT)
Dept: OPERATING ROOM | Age: 70
End: 2025-01-21
Payer: MEDICARE

## 2025-01-21 ENCOUNTER — HOSPITAL ENCOUNTER (OUTPATIENT)
Age: 70
Setting detail: OUTPATIENT SURGERY
Discharge: HOME | End: 2025-01-21
Attending: SURGERY | Admitting: SURGERY
Payer: MEDICARE

## 2025-01-21 ENCOUNTER — ANESTHESIA (OUTPATIENT)
Dept: OPERATING ROOM | Age: 70
End: 2025-01-21
Payer: MEDICARE

## 2025-01-21 VITALS
TEMPERATURE: 97.7 F | RESPIRATION RATE: 18 BRPM | BODY MASS INDEX: 37.25 KG/M2 | HEIGHT: 64 IN | WEIGHT: 218.2 LBS | SYSTOLIC BLOOD PRESSURE: 112 MMHG | OXYGEN SATURATION: 96 % | DIASTOLIC BLOOD PRESSURE: 74 MMHG | HEART RATE: 71 BPM

## 2025-01-21 PROCEDURE — 2709999900 HC NON-CHARGEABLE SUPPLY: Performed by: SURGERY

## 2025-01-21 PROCEDURE — 7100000010 HC PHASE II RECOVERY - FIRST 15 MIN: Performed by: SURGERY

## 2025-01-21 PROCEDURE — 6360000002 HC RX W HCPCS: Performed by: NURSE ANESTHETIST, CERTIFIED REGISTERED

## 2025-01-21 PROCEDURE — 2580000003 HC RX 258: Performed by: SURGERY

## 2025-01-21 PROCEDURE — 2580000003 HC RX 258: Performed by: NURSE ANESTHETIST, CERTIFIED REGISTERED

## 2025-01-21 PROCEDURE — 3609027000 HC COLONOSCOPY: Performed by: SURGERY

## 2025-01-21 PROCEDURE — 7100000011 HC PHASE II RECOVERY - ADDTL 15 MIN: Performed by: SURGERY

## 2025-01-21 PROCEDURE — 3700000000 HC ANESTHESIA ATTENDED CARE: Performed by: SURGERY

## 2025-01-21 PROCEDURE — 3700000001 HC ADD 15 MINUTES (ANESTHESIA): Performed by: SURGERY

## 2025-01-21 RX ORDER — SODIUM CHLORIDE, SODIUM LACTATE, POTASSIUM CHLORIDE, CALCIUM CHLORIDE 600; 310; 30; 20 MG/100ML; MG/100ML; MG/100ML; MG/100ML
INJECTION, SOLUTION INTRAVENOUS
Status: DISCONTINUED | OUTPATIENT
Start: 2025-01-21 | End: 2025-01-21 | Stop reason: SDUPTHER

## 2025-01-21 RX ORDER — SODIUM CHLORIDE, SODIUM LACTATE, POTASSIUM CHLORIDE, CALCIUM CHLORIDE 600; 310; 30; 20 MG/100ML; MG/100ML; MG/100ML; MG/100ML
INJECTION, SOLUTION INTRAVENOUS CONTINUOUS
Status: DISCONTINUED | OUTPATIENT
Start: 2025-01-21 | End: 2025-01-21 | Stop reason: HOSPADM

## 2025-01-21 RX ORDER — SODIUM CHLORIDE 0.9 % (FLUSH) 0.9 %
5-40 SYRINGE (ML) INJECTION PRN
Status: DISCONTINUED | OUTPATIENT
Start: 2025-01-21 | End: 2025-01-21 | Stop reason: HOSPADM

## 2025-01-21 RX ORDER — SODIUM CHLORIDE 9 MG/ML
25 INJECTION, SOLUTION INTRAVENOUS PRN
Status: DISCONTINUED | OUTPATIENT
Start: 2025-01-21 | End: 2025-01-21 | Stop reason: HOSPADM

## 2025-01-21 RX ORDER — PROPOFOL 10 MG/ML
INJECTION, EMULSION INTRAVENOUS
Status: DISCONTINUED | OUTPATIENT
Start: 2025-01-21 | End: 2025-01-21 | Stop reason: SDUPTHER

## 2025-01-21 RX ORDER — SODIUM CHLORIDE 0.9 % (FLUSH) 0.9 %
5-40 SYRINGE (ML) INJECTION EVERY 12 HOURS SCHEDULED
Status: DISCONTINUED | OUTPATIENT
Start: 2025-01-21 | End: 2025-01-21 | Stop reason: HOSPADM

## 2025-01-21 RX ADMIN — SODIUM CHLORIDE, POTASSIUM CHLORIDE, SODIUM LACTATE AND CALCIUM CHLORIDE: 600; 310; 30; 20 INJECTION, SOLUTION INTRAVENOUS at 08:04

## 2025-01-21 RX ADMIN — PROPOFOL 150 MCG/KG/MIN: 10 INJECTION, EMULSION INTRAVENOUS at 08:42

## 2025-01-21 RX ADMIN — PROPOFOL 200 MG: 10 INJECTION, EMULSION INTRAVENOUS at 08:41

## 2025-01-21 RX ADMIN — SODIUM CHLORIDE, POTASSIUM CHLORIDE, SODIUM LACTATE AND CALCIUM CHLORIDE: 600; 310; 30; 20 INJECTION, SOLUTION INTRAVENOUS at 08:41

## 2025-01-21 ASSESSMENT — PAIN SCALES - GENERAL
PAINLEVEL_OUTOF10: 0
PAINLEVEL_OUTOF10: 0

## 2025-01-21 ASSESSMENT — PAIN - FUNCTIONAL ASSESSMENT: PAIN_FUNCTIONAL_ASSESSMENT: 0-10

## 2025-01-21 NOTE — DISCHARGE INSTRUCTIONS
Repeat Colonoscopy in 5 years due to family history colon cancer    DISCHARGE INSTRUCTIONS FOLLOWING COLONOSCOPY    Activity  You have received sedation.  Your judgement and coordination may be impaired.  Do not drive or operate any machinery today.  Do not make personal, medical, legal or business decisions today.  Do not consume alcohol, tranquilizers or sleeping medications today unless otherwise instructed by your physician.  Rest today.  You may resume normal activity tomorrow.    Because air is put into your colon during this procedure, it is normal to pass large amounts of air from your rectum.  Feelings of bloating, gas or cramping may persist for 24 hours.  You may not have a bowel movement for 1-3 days after this procedure.    Diet  Begin with sips of clear liquids and if no nausea, you may progress to your normal diet.    Medications  You may resume your usual medications, unless otherwise instructed.    Follow-Up  As instructed.    CALL YOUR PHYSICIAN if you experience any of the following:  Bleeding from your rectum (a teaspoonful or more)      - If you had a biopsy taken today, a small amount of bleeding may occur.  Severe abdominal pain/cramping and/or distention that is not relieved by passing gas.  Persistent nausea or vomiting.  Signs of infection including fever, chills, redness or swelling @ the IV site.      If you have questions or problems call [phone] ([facility]) or after business hours call [phone] ([facility])

## 2025-01-21 NOTE — ANESTHESIA POSTPROCEDURE EVALUATION
Department of Anesthesiology  Postprocedure Note    Patient: Agnes Jerome  MRN: [id number]  YOB: 1955  Date of evaluation: 1/21/2025    Procedure Summary       Date: 01/21/25 Room / Location: [facility]    Anesthesia Start: 0841 Anesthesia Stop: 0857    Procedure: Colonoscopy Diagnosis:       History of colon polyps      Family history of colon cancer      (History of colon polyps [Z86.0100])      (Family history of colon cancer [Z80.0])    Surgeons: Pierre Hood MD Responsible Provider: Mike Velasquez APRN - CRNA    Anesthesia Type: General, TIVA ASA Status: 2            Anesthesia Type: General, TIVA    Rylee Phase I: Rylee Score: 10    Rylee Phase II: Rylee Score: 8    Anesthesia Post Evaluation    Patient location during evaluation: bedside  Level of consciousness: sleepy but conscious  Airway patency: patent  Nausea & Vomiting: no nausea and no vomiting  Cardiovascular status: hemodynamically stable  Respiratory status: spontaneous ventilation  Hydration status: euvolemic  Pain management: satisfactory to patient    No notable events documented.

## 2025-01-21 NOTE — OP NOTE
COLONOSCOPY PROCEDURE NOTE:      Pre op diagnosis:       69 y.o.female  Last Colonoscopy 11- by Dr Hood with findings of tubular adenoma x 2 and hyperplastic polyp x 1    Yes family history of colon cancer father in 80s  Yes history of polyps as above at last CS  ScreeningCOLONOSCOPY PROCEDURE NOTE:         Operative Procedure:    1.  Colonoscopy     Surgeon:    Dr. Pierre Hood     Anesthesia:    IV sedation per CRNA    EBL:  minimal    Procedure:    Patient was taken to the endoscopy suite and placed in a left lateral decubitus position.  They were given IV sedation as mentioned above.  A digital rectal exam was performed.  There were no masses and anal tone was normal.  The colonoscope was inserted into the rectum and carefully manipulated up through the sigmoid colon, transverse colon, right colon and into the cecum.  The cecum was identified by the ileal cecal valve and transabdominal illumination.  Then the scope was slowly withdrawn.  The scope was retroflexed in the rectum and the dentate line was examined.  The scope was removed.  The patient tolerated the procedure well.  The following findings were noted.        Final Diagnosis:    Few sigmoid diverticuli  Otherwise normal colon    Plan:    Repeat CS in 5 years due to +family hx colon cancer

## 2025-01-21 NOTE — ANESTHESIA PRE PROCEDURE
History:    Social History     Tobacco Use    Smoking status: Never     Passive exposure: Never    Smokeless tobacco: Never   Substance Use Topics    Alcohol use: No                                Counseling given: Not Answered      Vital Signs (Current): There were no vitals filed for this visit.                                           BP Readings from Last 3 Encounters:   11/26/24 132/70   11/13/24 (!) 140/87   11/06/24 129/85       NPO Status: Time of last liquid consumption: 1900                        Time of last solid consumption: 1800                        Date of last liquid consumption: 01/20/25                        Date of last solid food consumption: 01/19/25    BMI:   Wt Readings from Last 3 Encounters:   11/26/24 102.1 kg (225 lb)   11/13/24 102.1 kg (225 lb)   11/06/24 102.9 kg (226 lb 12.8 oz)     There is no height or weight on file to calculate BMI.    CBC:   Lab Results   Component Value Date/Time    WBC 6.8 06/03/2024 08:32 AM    RBC 4.89 06/03/2024 08:32 AM    HGB 13.5 06/03/2024 08:32 AM    HCT 41.9 06/03/2024 08:32 AM    MCV 85.7 06/03/2024 08:32 AM    RDW 12.8 06/03/2024 08:32 AM     06/03/2024 08:32 AM       CMP:   Lab Results   Component Value Date/Time     06/03/2024 08:32 AM    K 4.0 06/03/2024 08:32 AM     06/03/2024 08:32 AM    CO2 27 06/03/2024 08:32 AM    BUN 18 06/03/2024 08:32 AM    CREATININE 0.7 06/03/2024 08:32 AM    GFRAA >60 05/04/2022 07:31 AM    LABGLOM >90 06/03/2024 08:32 AM    LABGLOM >60 02/01/2024 08:03 AM    GLUCOSE 113 06/03/2024 08:32 AM    CALCIUM 9.3 06/03/2024 08:32 AM    BILITOT 1.1 06/03/2024 08:32 AM    ALKPHOS 96 06/03/2024 08:32 AM    AST 17 06/03/2024 08:32 AM    ALT 17 06/03/2024 08:32 AM       POC Tests: No results for input(s): \"POCGLU\", \"POCNA\", \"POCK\", \"POCCL\", \"POCBUN\", \"POCHEMO\", \"POCHCT\" in the last 72 hours.    Coags: No results found for: \"PROTIME\", \"INR\", \"APTT\"    HCG (If Applicable): No results found for: \"PREGTESTUR\",

## 2025-01-30 ENCOUNTER — TELEPHONE (OUTPATIENT)
Dept: SURGERY | Age: 70
End: 2025-01-30

## 2025-01-30 NOTE — TELEPHONE ENCOUNTER
Letter created and mailed to patient with results from recent CS at [facility] with Dr. Hood on 1/21/2025. Updated history, health maintenance, and recall. Forwarded results to PCP.

## 2025-02-05 ENCOUNTER — HOSPITAL ENCOUNTER (OUTPATIENT)
Age: 70
Discharge: HOME | End: 2025-02-05
Payer: MEDICARE

## 2025-02-05 DIAGNOSIS — Z15.01 BRCA2 POSITIVE: ICD-10-CM

## 2025-02-05 DIAGNOSIS — Z15.09 BRCA2 POSITIVE: ICD-10-CM

## 2025-02-05 LAB
CREAT SERPL-MCNC: 0.7 MG/DL (ref 0.5–0.9)
GFR, ESTIMATED: >90 ML/MIN/1.73M2

## 2025-02-05 PROCEDURE — 82565 ASSAY OF CREATININE: CPT

## 2025-02-05 PROCEDURE — 36415 COLL VENOUS BLD VENIPUNCTURE: CPT

## 2025-02-07 ENCOUNTER — HOSPITAL ENCOUNTER (OUTPATIENT)
Dept: MRI IMAGING | Age: 70
Discharge: HOME | End: 2025-02-09
Attending: FAMILY MEDICINE
Payer: MEDICARE

## 2025-02-07 DIAGNOSIS — Z84.81 FAMILY HISTORY OF BREAST CANCER GENE MUTATION IN FIRST DEGREE RELATIVE: ICD-10-CM

## 2025-02-07 DIAGNOSIS — Z85.3 PERSONAL HISTORY OF MALIGNANT NEOPLASM OF BREAST: ICD-10-CM

## 2025-02-07 PROCEDURE — 6360000004 HC RX CONTRAST MEDICATION: Performed by: FAMILY MEDICINE

## 2025-02-07 PROCEDURE — 2709999900 MRI BREAST BILATERAL W WO CONTRAST

## 2025-02-07 PROCEDURE — A9579 GAD-BASE MR CONTRAST NOS,1ML: HCPCS | Performed by: FAMILY MEDICINE

## 2025-02-07 RX ADMIN — GADOTERIDOL 20 ML: 279.3 INJECTION, SOLUTION INTRAVENOUS at 09:02

## 2025-03-03 NOTE — PROGRESS NOTES
[facility], [facility] UROGYNECOLOGY AND PELVIC REHABILITATION   [address] [place] [address]  Dept: [phone]  Date: 3/10/2025  Patient Name: Agnes Jerome    VISIT - FOLLOW UP VISIT     CC: lichen sclerosus, atrophy, UTI hx    Chaperone present for entire visit and pertinent physical exam:None Required    HPI: Follow up, pt reports feeling well.  Denies UTI sx, u/a clear today.  Clobetasol and VET prescribed. She continues VET about twice monthly and clobetasol about once weekly. No vulvar itching/irritation, no vaginal dryness.    Status post: Vaginal Colposcopy With Vulvarvaginal Biopsies   Date: 11/6/2024  Inflammation, atrophy, lichen sclerosus, NO dysplasia    2014 Dr Michael Salazar; VH with BSO, external [facility] culdoplasty with uterosacral ligament suspension, anterior colporrhapy, cystourethyocopy, posterior colporrhapy.   Hx of cervical dysplasia/HR HPV, treated with conization 1999.      Overall state of well-being, dietary and nutritional habits, exercise routines of at least 30 minutes 3 times a week, bowel and bladder function, smoking history, HRT history, sexual activity and partner/s, dyspareunia, and immunizations all revisited if necessary by chart review or direct questioning.    Topics of Prolapse, Pain, Pressure were revisited including type, period of onset, level of severity, quality and quantity, associations, trends, exacerbators, alleviators, bleeding, prolapse to reduce, splinting, and prior treatment / surgery.    Topics of Urinary Leakage were revisited including type, period of onset, level of severity, quality and quantity, associations, trends, exacerbators, alleviators, urgency, frequency, nocturia ,urge incontinence / stress incontinence triggers, hesitancy, obstruction with need to catheterize, frequent UTI\"s >3 in a year diagnosed by culture, hematuria (gross or microscopic), urinary stones, and prior

## 2025-03-10 ENCOUNTER — OFFICE VISIT (OUTPATIENT)
Age: 70
End: 2025-03-10
Payer: MEDICARE

## 2025-03-10 VITALS
DIASTOLIC BLOOD PRESSURE: 97 MMHG | SYSTOLIC BLOOD PRESSURE: 148 MMHG | TEMPERATURE: 97.8 F | HEART RATE: 72 BPM | OXYGEN SATURATION: 93 % | BODY MASS INDEX: 38.62 KG/M2 | WEIGHT: 226.2 LBS | HEIGHT: 64 IN

## 2025-03-10 DIAGNOSIS — L90.0 LICHEN SCLEROSUS: ICD-10-CM

## 2025-03-10 DIAGNOSIS — Z87.410 HISTORY OF CERVICAL DYSPLASIA: ICD-10-CM

## 2025-03-10 DIAGNOSIS — N95.2 ATROPHIC VULVOVAGINITIS: Primary | ICD-10-CM

## 2025-03-10 LAB
BILIRUBIN, POC: NORMAL
BLOOD URINE, POC: NORMAL
CLARITY, POC: NORMAL
COLOR, POC: NORMAL
GLUCOSE URINE, POC: NORMAL MG/DL
KETONES, POC: NORMAL MG/DL
LEUKOCYTE EST, POC: NORMAL
NITRITE, POC: NORMAL
PH, POC: 6
PROTEIN, POC: NORMAL MG/DL
SPECIFIC GRAVITY, POC: 1.02
UROBILINOGEN, POC: 1 MG/DL

## 2025-03-10 PROCEDURE — 1159F MED LIST DOCD IN RCRD: CPT | Performed by: NURSE PRACTITIONER

## 2025-03-10 PROCEDURE — 1160F RVW MEDS BY RX/DR IN RCRD: CPT | Performed by: NURSE PRACTITIONER

## 2025-03-10 PROCEDURE — 81003 URINALYSIS AUTO W/O SCOPE: CPT | Performed by: NURSE PRACTITIONER

## 2025-03-10 PROCEDURE — 1123F ACP DISCUSS/DSCN MKR DOCD: CPT | Performed by: NURSE PRACTITIONER

## 2025-03-10 PROCEDURE — 99214 OFFICE O/P EST MOD 30 MIN: CPT | Performed by: NURSE PRACTITIONER

## 2025-03-10 NOTE — PATIENT INSTRUCTIONS
Check BP at home, if 140/90 call PCP  Use vaginal estrogen cream and clobetasol to vulva twice weekly  PCP orders breast imaging

## 2025-05-28 SDOH — HEALTH STABILITY: PHYSICAL HEALTH: ON AVERAGE, HOW MANY DAYS PER WEEK DO YOU ENGAGE IN MODERATE TO STRENUOUS EXERCISE (LIKE A BRISK WALK)?: 3 DAYS

## 2025-05-28 SDOH — HEALTH STABILITY: PHYSICAL HEALTH: ON AVERAGE, HOW MANY MINUTES DO YOU ENGAGE IN EXERCISE AT THIS LEVEL?: 30 MIN

## 2025-05-28 ASSESSMENT — PATIENT HEALTH QUESTIONNAIRE - PHQ9
2. FEELING DOWN, DEPRESSED OR HOPELESS: NOT AT ALL
SUM OF ALL RESPONSES TO PHQ QUESTIONS 1-9: 0
1. LITTLE INTEREST OR PLEASURE IN DOING THINGS: NOT AT ALL
SUM OF ALL RESPONSES TO PHQ QUESTIONS 1-9: 0

## 2025-05-28 ASSESSMENT — LIFESTYLE VARIABLES
HOW MANY STANDARD DRINKS CONTAINING ALCOHOL DO YOU HAVE ON A TYPICAL DAY: 1 OR 2
HOW OFTEN DO YOU HAVE A DRINK CONTAINING ALCOHOL: 2
HOW OFTEN DO YOU HAVE SIX OR MORE DRINKS ON ONE OCCASION: 1
HOW OFTEN DO YOU HAVE A DRINK CONTAINING ALCOHOL: MONTHLY OR LESS
HOW MANY STANDARD DRINKS CONTAINING ALCOHOL DO YOU HAVE ON A TYPICAL DAY: 1

## 2025-06-03 ENCOUNTER — RESULTS FOLLOW-UP (OUTPATIENT)
Dept: FAMILY MEDICINE CLINIC | Age: 70
End: 2025-06-03

## 2025-06-03 ENCOUNTER — HOSPITAL ENCOUNTER (OUTPATIENT)
Age: 70
Discharge: HOME | End: 2025-06-03
Payer: MEDICARE

## 2025-06-03 DIAGNOSIS — K21.9 GASTROESOPHAGEAL REFLUX DISEASE WITHOUT ESOPHAGITIS: ICD-10-CM

## 2025-06-03 DIAGNOSIS — E78.2 MIXED HYPERLIPIDEMIA: ICD-10-CM

## 2025-06-03 DIAGNOSIS — R73.01 IMPAIRED FASTING GLUCOSE: ICD-10-CM

## 2025-06-03 LAB
ALBUMIN SERPL-MCNC: 4.3 G/DL (ref 3.5–5.2)
ALBUMIN/GLOB SERPL: 1.4 {RATIO} (ref 1–2.5)
ALP SERPL-CCNC: 100 U/L (ref 35–104)
ALT SERPL-CCNC: 16 U/L (ref 10–35)
ANION GAP SERPL CALCULATED.3IONS-SCNC: 12 MMOL/L (ref 9–16)
AST SERPL-CCNC: 18 U/L (ref 10–35)
BASOPHILS # BLD: 0.05 K/UL (ref 0–0.2)
BASOPHILS NFR BLD: 1 % (ref 0–2)
BILIRUB SERPL-MCNC: 1.3 MG/DL (ref 0–1.2)
BUN SERPL-MCNC: 18 MG/DL (ref 8–23)
BUN/CREAT SERPL: 23 (ref 9–20)
CALCIUM SERPL-MCNC: 9.4 MG/DL (ref 8.6–10.4)
CHLORIDE SERPL-SCNC: 105 MMOL/L (ref 98–107)
CHOLEST SERPL-MCNC: 188 MG/DL (ref 0–199)
CHOLESTEROL/HDL RATIO: 3.7
CO2 SERPL-SCNC: 25 MMOL/L (ref 20–31)
CREAT SERPL-MCNC: 0.8 MG/DL (ref 0.6–0.9)
EOSINOPHIL # BLD: 0.48 K/UL (ref 0–0.44)
EOSINOPHILS RELATIVE PERCENT: 6 % (ref 1–4)
ERYTHROCYTE [DISTWIDTH] IN BLOOD BY AUTOMATED COUNT: 12.9 % (ref 11.8–14.4)
EST. AVERAGE GLUCOSE BLD GHB EST-MCNC: 111 MG/DL
GFR, ESTIMATED: 80 ML/MIN/1.73M2
GLUCOSE SERPL-MCNC: 105 MG/DL (ref 74–99)
HBA1C MFR BLD: 5.5 % (ref 4–6)
HCT VFR BLD AUTO: 42.7 % (ref 36.3–47.1)
HDLC SERPL-MCNC: 51 MG/DL
HGB BLD-MCNC: 13.8 G/DL (ref 11.9–15.1)
IMM GRANULOCYTES # BLD AUTO: 0.04 K/UL (ref 0–0.3)
IMM GRANULOCYTES NFR BLD: 1 %
LDLC SERPL CALC-MCNC: 109 MG/DL (ref 0–100)
LYMPHOCYTES NFR BLD: 2.61 K/UL (ref 1.1–3.7)
LYMPHOCYTES RELATIVE PERCENT: 34 % (ref 24–43)
MCH RBC QN AUTO: 27.4 PG (ref 25.2–33.5)
MCHC RBC AUTO-ENTMCNC: 32.3 G/DL (ref 25.2–33.5)
MCV RBC AUTO: 84.7 FL (ref 82.6–102.9)
MONOCYTES NFR BLD: 0.52 K/UL (ref 0.1–1.2)
MONOCYTES NFR BLD: 7 % (ref 3–12)
NEUTROPHILS NFR BLD: 51 % (ref 36–65)
NEUTS SEG NFR BLD: 4.09 K/UL (ref 1.5–8.1)
NRBC BLD-RTO: 0 PER 100 WBC
PLATELET # BLD AUTO: 320 K/UL (ref 138–453)
PMV BLD AUTO: 8.6 FL (ref 8.1–13.5)
POTASSIUM SERPL-SCNC: 4.1 MMOL/L (ref 3.7–5.3)
PROT SERPL-MCNC: 7.4 G/DL (ref 6.6–8.7)
RBC # BLD AUTO: 5.04 M/UL (ref 3.95–5.11)
SODIUM SERPL-SCNC: 142 MMOL/L (ref 136–145)
TRIGL SERPL-MCNC: 141 MG/DL
TSH SERPL DL<=0.05 MIU/L-ACNC: 2.2 UIU/ML (ref 0.27–4.2)
VLDLC SERPL CALC-MCNC: 28 MG/DL (ref 1–30)
WBC OTHER # BLD: 7.8 K/UL (ref 3.5–11.3)

## 2025-06-03 PROCEDURE — 80053 COMPREHEN METABOLIC PANEL: CPT

## 2025-06-03 PROCEDURE — 85025 COMPLETE CBC W/AUTO DIFF WBC: CPT

## 2025-06-03 PROCEDURE — 84443 ASSAY THYROID STIM HORMONE: CPT

## 2025-06-03 PROCEDURE — 83036 HEMOGLOBIN GLYCOSYLATED A1C: CPT

## 2025-06-03 PROCEDURE — 36415 COLL VENOUS BLD VENIPUNCTURE: CPT

## 2025-06-03 PROCEDURE — 80061 LIPID PANEL: CPT

## 2025-06-11 ENCOUNTER — OFFICE VISIT (OUTPATIENT)
Dept: FAMILY MEDICINE CLINIC | Age: 70
End: 2025-06-11

## 2025-06-11 VITALS
TEMPERATURE: 97.1 F | BODY MASS INDEX: 37.49 KG/M2 | WEIGHT: 225 LBS | OXYGEN SATURATION: 97 % | HEIGHT: 65 IN | DIASTOLIC BLOOD PRESSURE: 90 MMHG | SYSTOLIC BLOOD PRESSURE: 144 MMHG | RESPIRATION RATE: 16 BRPM | HEART RATE: 76 BPM

## 2025-06-11 DIAGNOSIS — E78.2 MIXED HYPERLIPIDEMIA: ICD-10-CM

## 2025-06-11 DIAGNOSIS — I10 PRIMARY HYPERTENSION: Primary | ICD-10-CM

## 2025-06-11 DIAGNOSIS — Z00.00 MEDICARE ANNUAL WELLNESS VISIT, SUBSEQUENT: ICD-10-CM

## 2025-06-11 DIAGNOSIS — K21.9 GASTROESOPHAGEAL REFLUX DISEASE WITHOUT ESOPHAGITIS: ICD-10-CM

## 2025-06-11 DIAGNOSIS — Z78.0 MENOPAUSE: ICD-10-CM

## 2025-06-11 DIAGNOSIS — R73.01 IMPAIRED FASTING GLUCOSE: ICD-10-CM

## 2025-06-11 RX ORDER — LISINOPRIL 10 MG/1
10 TABLET ORAL DAILY
Qty: 90 TABLET | Refills: 3 | Status: SHIPPED | OUTPATIENT
Start: 2025-06-11

## 2025-06-11 SDOH — ECONOMIC STABILITY: FOOD INSECURITY: WITHIN THE PAST 12 MONTHS, YOU WORRIED THAT YOUR FOOD WOULD RUN OUT BEFORE YOU GOT MONEY TO BUY MORE.: NEVER TRUE

## 2025-06-11 SDOH — ECONOMIC STABILITY: FOOD INSECURITY: WITHIN THE PAST 12 MONTHS, THE FOOD YOU BOUGHT JUST DIDN'T LAST AND YOU DIDN'T HAVE MONEY TO GET MORE.: NEVER TRUE

## 2025-06-11 ASSESSMENT — ENCOUNTER SYMPTOMS
TROUBLE SWALLOWING: 0
EYE DISCHARGE: 0
CONSTIPATION: 0
DIARRHEA: 0
EYE REDNESS: 0
SINUS PRESSURE: 0
VOMITING: 0
RHINORRHEA: 0

## 2025-06-11 NOTE — PROGRESS NOTES
2025     Agnes Jerome (:  1955) is a 69 y.o. female, here for evaluation of the following medical concerns:    HPI    History of Present Illness  The patient is a 69-year-old female here for an annual follow-up of hyperlipidemia, impaired fasting glucose, GERD, and to discuss elevated blood pressure readings.    She has been experiencing mild headaches, which she attributes to her recent prescription of bifocal lenses. She plans to revisit her optometrist for further evaluation as she is having difficulty with reading. She reports no chest pain or respiratory distress. Her blood pressure was previously recorded at 140 systolic, which subsequently decreased to 137. She has observed a general increase in her blood pressure levels. She possesses a home blood pressure monitor for regular monitoring. Her father had high blood pressure.    She has received the influenza vaccine due to her volunteering at a [employer] and has completed the initial series of COVID-19 vaccinations.    FAMILY HISTORY  Her father had high blood pressure.       Patient's recent lab reports are as follows:    Results for orders placed or performed during the hospital encounter of 25   TSH   Result Value Ref Range    TSH 2.20 0.27 - 4.20 uIU/mL   CBC with Auto Differential   Result Value Ref Range    WBC 7.8 3.5 - 11.3 k/uL    RBC 5.04 3.95 - 5.11 m/uL    Hemoglobin 13.8 11.9 - 15.1 g/dL    Hematocrit 42.7 36.3 - 47.1 %    MCV 84.7 82.6 - 102.9 fL    MCH 27.4 25.2 - 33.5 pg    MCHC 32.3 25.2 - 33.5 g/dL    RDW 12.9 11.8 - 14.4 %    Platelets 320 138 - 453 k/uL    MPV 8.6 8.1 - 13.5 fL    NRBC Automated 0.0 0.0 per 100 WBC    Neutrophils % 51 36 - 65 %    Lymphocytes % 34 24 - 43 %    Monocytes % 7 3 - 12 %    Eosinophils % 6 (H) 1 - 4 %    Basophils % 1 0 - 2 %    Immature Granulocytes % 1 (H) 0 %    Neutrophils Absolute 4.09 1.50 - 8.10 k/uL    Lymphocytes Absolute 2.61 1.10 - 3.70 k/uL    Monocytes Absolute 
Patient declines covid and RSV vaccines at this time. Dexa order placed. Mammogram is scheduled.  
Start after biopsy sites healed. Yes Anita Huntley APRN - CNP   estradiol (ESTRACE VAGINAL) 0.1 MG/GM vaginal cream Place 1 g vaginally Twice a Week Apply blueberry sized amount to vulva and opening of vagina twice weekly. Start after vulvar biopsy sites completely healed. Yes Anita Huntley APRN - CNP   ibuprofen (ADVIL;MOTRIN) 600 MG tablet Take 1 tablet by mouth every 8 hours as needed for Pain Yes Provider, MD Guillermo   Multiple Vitamins-Minerals (MULTIVITAMIN PO) Take 1 tablet by mouth daily Yes Provider, MD Guillermo       CareTeam (Including outside providers/suppliers regularly involved in providing care):   Patient Care Team:  Caro Leavitt DO as PCP - General (Family Medicine)  Caro Leavitt DO as PCP - Empaneled Provider     Recommendations for Preventive Services Due: see orders and patient instructions/AVS.  Recommended screening schedule for the next 5-10 years is provided to the patient in written form: see Patient Instructions/AVS.     Reviewed and updated this visit:  Tobacco  Allergies  Meds  Med Hx  Surg Hx  Fam Hx  Sexual Hx

## 2025-06-11 NOTE — PATIENT INSTRUCTIONS
Hospital Outpatient Visit on 06/03/2025   Component Date Value Ref Range Status   • TSH 06/03/2025 2.20  0.27 - 4.20 uIU/mL Final   • WBC 06/03/2025 7.8  3.5 - 11.3 k/uL Final   • RBC 06/03/2025 5.04  3.95 - 5.11 m/uL Final   • Hemoglobin 06/03/2025 13.8  11.9 - 15.1 g/dL Final   • Hematocrit 06/03/2025 42.7  36.3 - 47.1 % Final   • MCV 06/03/2025 84.7  82.6 - 102.9 fL Final   • MCH 06/03/2025 27.4  25.2 - 33.5 pg Final   • MCHC 06/03/2025 32.3  25.2 - 33.5 g/dL Final   • RDW 06/03/2025 12.9  11.8 - 14.4 % Final   • Platelets 06/03/2025 320  138 - 453 k/uL Final   • MPV 06/03/2025 8.6  8.1 - 13.5 fL Final   • NRBC Automated 06/03/2025 0.0  0.0 per 100 WBC Final   • Neutrophils % 06/03/2025 51  36 - 65 % Final   • Lymphocytes % 06/03/2025 34  24 - 43 % Final   • Monocytes % 06/03/2025 7  3 - 12 % Final   • Eosinophils % 06/03/2025 6 (H)  1 - 4 % Final   • Basophils % 06/03/2025 1  0 - 2 % Final   • Immature Granulocytes % 06/03/2025 1 (H)  0 % Final   • Neutrophils Absolute 06/03/2025 4.09  1.50 - 8.10 k/uL Final   • Lymphocytes Absolute 06/03/2025 2.61  1.10 - 3.70 k/uL Final   • Monocytes Absolute 06/03/2025 0.52  0.10 - 1.20 k/uL Final   • Eosinophils Absolute 06/03/2025 0.48 (H)  0.00 - 0.44 k/uL Final   • Basophils Absolute 06/03/2025 0.05  0.00 - 0.20 k/uL Final   • Immature Granulocytes Absolute 06/03/2025 0.04  0.00 - 0.30 k/uL Final   • Hemoglobin A1C 06/03/2025 5.5  4.0 - 6.0 % Final   • Estimated Avg Glucose 06/03/2025 111  mg/dL Final    Comment: The ADA and AACC recommend providing the estimated average glucose result to permit better   patient understanding of their HBA1c result.     • Sodium 06/03/2025 142  136 - 145 mmol/L Final   • Potassium 06/03/2025 4.1  3.7 - 5.3 mmol/L Final   • Chloride 06/03/2025 105  98 - 107 mmol/L Final   • CO2 06/03/2025 25  20 - 31 mmol/L Final   • Anion Gap 06/03/2025 12  9 - 16 mmol/L Final   • Glucose 06/03/2025 105 (H)  74 - 99 mg/dL Final   • BUN 06/03/2025 18  8 -

## 2025-06-24 ENCOUNTER — HOSPITAL ENCOUNTER (OUTPATIENT)
Dept: BONE DENSITY | Age: 70
Discharge: HOME | End: 2025-06-26
Attending: FAMILY MEDICINE
Payer: MEDICARE

## 2025-06-24 DIAGNOSIS — Z78.0 MENOPAUSE: ICD-10-CM

## 2025-06-24 PROCEDURE — 77080 DXA BONE DENSITY AXIAL: CPT

## 2025-06-25 ENCOUNTER — RESULTS FOLLOW-UP (OUTPATIENT)
Dept: FAMILY MEDICINE CLINIC | Age: 70
End: 2025-06-25

## 2025-06-26 RX ORDER — IBANDRONATE SODIUM 150 MG/1
150 TABLET, FILM COATED ORAL
Qty: 3 TABLET | Refills: 3 | Status: SHIPPED | OUTPATIENT
Start: 2025-06-26

## 2025-06-26 NOTE — TELEPHONE ENCOUNTER
Patient willing to start Boniva as suggested on Dexa Scan done 6/24/2025. Please send [facility] to [facility]

## 2025-07-02 ENCOUNTER — HOSPITAL ENCOUNTER (OUTPATIENT)
Dept: MAMMOGRAPHY | Age: 70
Discharge: HOME | End: 2025-07-04
Payer: MEDICARE

## 2025-07-02 ENCOUNTER — RESULTS FOLLOW-UP (OUTPATIENT)
Dept: FAMILY MEDICINE CLINIC | Age: 70
End: 2025-07-02

## 2025-07-02 DIAGNOSIS — Z12.31 SCREENING MAMMOGRAM, ENCOUNTER FOR: ICD-10-CM

## 2025-07-02 PROCEDURE — 77063 BREAST TOMOSYNTHESIS BI: CPT

## 2025-09-04 ENCOUNTER — PATIENT MESSAGE (OUTPATIENT)
Dept: FAMILY MEDICINE CLINIC | Age: 70
End: 2025-09-04

## 2025-09-04 RX ORDER — LOSARTAN POTASSIUM 50 MG/1
50 TABLET ORAL DAILY
Qty: 90 TABLET | Refills: 3 | Status: SHIPPED | OUTPATIENT
Start: 2025-09-04

## (undated) DEVICE — ELECTRODE PT RET AD L9FT HI MOIST COND ADH HYDRGEL CORDED

## (undated) DEVICE — BLANKET WRM W29.9XL79.1IN UP BODY FORC AIR MISTRAL-AIR

## (undated) DEVICE — SOLUTION IRRIG 1000ML 0.9% SOD CHL USP POUR PLAS BTL

## (undated) DEVICE — 60 ML SYRINGE REGULAR TIP: Brand: MONOJECT

## (undated) DEVICE — MERCY DEFIANCE ENDO KIT: Brand: MEDLINE INDUSTRIES, INC.

## (undated) DEVICE — FORCEPS BX L240CM JAW DIA2.4MM ORNG L CAP W/ NDL DISP RAD

## (undated) DEVICE — 1200CC GUARDIAN II: Brand: GUARDIAN

## (undated) DEVICE — MHPB GYN MINOR PACK: Brand: MEDLINE INDUSTRIES, INC.

## (undated) DEVICE — INTENDED FOR TISSUE SEPARATION, AND OTHER PROCEDURES THAT REQUIRE A SHARP SURGICAL BLADE TO PUNCTURE OR CUT.: Brand: BARD-PARKER ® CARBON RIB-BACK BLADES

## (undated) DEVICE — CANNULA NSL AD L2IN ETCO2 SAMP SFT CRUSH RESIST FEM AIRLFE

## (undated) DEVICE — YANKAUER,FLEXIBLE HANDLE,REGLR CAPACITY: Brand: MEDLINE INDUSTRIES, INC.

## (undated) DEVICE — PAD,SANITARY,11 IN,MAXI,W/WINGS,N-STRL: Brand: MEDLINE

## (undated) DEVICE — STRAP RESTRAIN W3.5XL19IN TECLIN STRRP POS LEG DURING LITH

## (undated) DEVICE — STRAP,POSITIONING,KNEE/BODY,FOAM,4X60": Brand: MEDLINE

## (undated) DEVICE — CONNECTOR TBNG AUX H2O JET DISP FOR OLY 160/180 SER

## (undated) DEVICE — NEPTUNE E-SEP SMOKE EVACUATION PENCIL, COATED, 70MM BLADE, PUSH BUTTON SWITCH: Brand: NEPTUNE E-SEP

## (undated) DEVICE — GLOVE ORANGE PI 7 1/2   MSG9075

## (undated) DEVICE — CO2 CANNULA,SSOFT,ADLT,7O2,4CO2,FEMALE: Brand: MEDLINE

## (undated) DEVICE — UNDERPANTS MAT L XL SEAMLESS CLR CODE WAISTBAND KNIT